# Patient Record
Sex: MALE | Race: WHITE | NOT HISPANIC OR LATINO
[De-identification: names, ages, dates, MRNs, and addresses within clinical notes are randomized per-mention and may not be internally consistent; named-entity substitution may affect disease eponyms.]

---
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
## 2023-06-13 ENCOUNTER — APPOINTMENT (OUTPATIENT)
Dept: ORTHOPEDIC SURGERY | Facility: CLINIC | Age: 18
End: 2023-06-13
Payer: COMMERCIAL

## 2023-06-13 VITALS
SYSTOLIC BLOOD PRESSURE: 134 MMHG | OXYGEN SATURATION: 97 % | BODY MASS INDEX: 28.56 KG/M2 | DIASTOLIC BLOOD PRESSURE: 69 MMHG | TEMPERATURE: 98 F | HEIGHT: 71 IN | HEART RATE: 52 BPM | WEIGHT: 204 LBS

## 2023-06-13 PROCEDURE — 73630 X-RAY EXAM OF FOOT: CPT | Mod: 50

## 2023-06-13 PROCEDURE — 99214 OFFICE O/P EST MOD 30 MIN: CPT

## 2023-06-15 ENCOUNTER — APPOINTMENT (OUTPATIENT)
Dept: ALLERGY | Facility: CLINIC | Age: 18
End: 2023-06-15
Payer: COMMERCIAL

## 2023-06-15 ENCOUNTER — NON-APPOINTMENT (OUTPATIENT)
Age: 18
End: 2023-06-15

## 2023-06-15 VITALS
BODY MASS INDEX: 28.28 KG/M2 | DIASTOLIC BLOOD PRESSURE: 68 MMHG | TEMPERATURE: 98.4 F | HEART RATE: 56 BPM | RESPIRATION RATE: 16 BRPM | WEIGHT: 202 LBS | SYSTOLIC BLOOD PRESSURE: 132 MMHG | OXYGEN SATURATION: 97 % | HEIGHT: 71 IN

## 2023-06-15 PROCEDURE — 95004 PERQ TESTS W/ALRGNC XTRCS: CPT

## 2023-06-15 PROCEDURE — 99204 OFFICE O/P NEW MOD 45 MIN: CPT | Mod: 25

## 2023-06-15 NOTE — IMPRESSION
[_____] : grasses ([unfilled]) [Allergy Testing Dust Mite] : dust mites [Allergy Testing Cockroach] : cockroach [Allergy Testing Dog] : dog [Allergy Testing Cat] : cat [] : molds

## 2023-06-15 NOTE — ASSESSMENT
[FreeTextEntry1] : Seasonal allergic rhinoconjunctivitis:\par Chronic sinusitis\par Anosmia\par \par Doxycycline 100 mg BID x 14 days\par Hypertonic saline irrigation TID\par Flonase 2 puffs QD \par \par RV rhinoscopy if not improved he will need a course of prednisone

## 2023-06-15 NOTE — SOCIAL HISTORY
[House] : [unfilled] lives in a house  [Radiator/Baseboard] : heating provided by radiator(s)/baseboard(s) [Central] : air conditioning provided by central unit [Dog] : dog [Single] : single [FreeTextEntry1] : Lives with parents\par Rising sophomore  [Bedroom] : not in the bedroom [Basement] : not in the basement [Living Area] : not in the living area [Smokers in Household] : there are no smokers in the home [de-identified] : x2

## 2023-06-15 NOTE — HISTORY OF PRESENT ILLNESS
[de-identified] : Patient with increased allergies at school - nasal congestion - coughing eyes - rhinorrhea - at the end of school he had low grade fever - more mucus - coughing - COVID negative - now with lingering symptoms - green mucus from his nose and chest - sinus HA - decreased sense of smell and taste.   No wheezing in chest.   \par \par For seasonal allergies - he was taking Claritin and Flonase. \par \par He has had sinus infections at school - treated with Doxycycline and azithromycin.

## 2023-06-15 NOTE — PHYSICAL EXAM
[Alert] : alert [Well Nourished] : well nourished [Healthy Appearance] : healthy appearance [No Acute Distress] : no acute distress [Well Developed] : well developed [Normal Voice/Communication] : normal voice communication [Boggy Nasal Turbinates] : no boggy and/or pale nasal turbinates [No Neck Mass] : no neck mass was observed [No LAD] : no lymphadenopathy [No Thyroid Mass] : no thyroid mass [No Crackles] : no crackles [Normal Rate and Effort] : normal respiratory rhythm and effort [No Retractions] : no retractions [Wheezing] : no wheezing was heard [Normal Rate] : heart rate was normal  [Normal S1, S2] : normal S1 and S2 [No murmur] : no murmur [Regular Rhythm] : with a regular rhythm [Normal Cervical Lymph Nodes] : cervical [No Rash] : no rash [Normal Mood] : mood was normal [Normal Affect] : affect was normal [Judgment and Insight Age Appropriate] : judgement and insight is age appropriate [Alert, Awake, Oriented as Age-Appropriate] : alert, awake, oriented as age appropriate

## 2023-06-19 ENCOUNTER — OUTPATIENT (OUTPATIENT)
Dept: OUTPATIENT SERVICES | Facility: HOSPITAL | Age: 18
LOS: 1 days | End: 2023-06-19
Payer: COMMERCIAL

## 2023-06-19 ENCOUNTER — APPOINTMENT (OUTPATIENT)
Dept: MRI IMAGING | Facility: CLINIC | Age: 18
End: 2023-06-19
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
## 2023-06-19 PROCEDURE — 73718 MRI LOWER EXTREMITY W/O DYE: CPT | Mod: 26,LT

## 2023-06-19 PROCEDURE — 73718 MRI LOWER EXTREMITY W/O DYE: CPT

## 2023-06-28 ENCOUNTER — APPOINTMENT (OUTPATIENT)
Dept: ALLERGY | Facility: CLINIC | Age: 18
End: 2023-06-28
Payer: COMMERCIAL

## 2023-06-28 PROCEDURE — 99213 OFFICE O/P EST LOW 20 MIN: CPT | Mod: 25

## 2023-06-28 PROCEDURE — 31231 NASAL ENDOSCOPY DX: CPT | Mod: 59

## 2023-06-28 NOTE — HISTORY OF PRESENT ILLNESS
[de-identified] : Patient has been taking Doxycycline BID - hypertonic saline and Flonase - he reports significant improvement in his symptoms

## 2023-06-28 NOTE — SOCIAL HISTORY
[FreeTextEntry1] : Lives with parents\par Rising sophomore  [House] : [unfilled] lives in a house  [Radiator/Baseboard] : heating provided by radiator(s)/baseboard(s) [Central] : air conditioning provided by central unit [Bedroom] : not in the bedroom [Basement] : not in the basement [Living Area] : not in the living area [Dog] : dog [Single] : single [Smokers in Household] : there are no smokers in the home [de-identified] : x2

## 2023-06-28 NOTE — PHYSICAL EXAM
[Alert] : alert [Well Nourished] : well nourished [Healthy Appearance] : healthy appearance [No Acute Distress] : no acute distress [Well Developed] : well developed [Normal Voice/Communication] : normal voice communication [Boggy Nasal Turbinates] : no boggy and/or pale nasal turbinates [No Neck Mass] : no neck mass was observed [No LAD] : no lymphadenopathy [No Thyroid Mass] : no thyroid mass [Normal Rate and Effort] : normal respiratory rhythm and effort [No Crackles] : no crackles [No Retractions] : no retractions [Wheezing] : no wheezing was heard [Normal Rate] : heart rate was normal  [Normal S1, S2] : normal S1 and S2 [No murmur] : no murmur [Regular Rhythm] : with a regular rhythm [Normal Cervical Lymph Nodes] : cervical [No Rash] : no rash [Normal Mood] : mood was normal [Normal Affect] : affect was normal [Judgment and Insight Age Appropriate] : judgement and insight is age appropriate [Alert, Awake, Oriented as Age-Appropriate] : alert, awake, oriented as age appropriate

## 2023-06-28 NOTE — ASSESSMENT
[FreeTextEntry1] : Seasonal allergic rhinitis\par Deviated septum\par \par Continue hypertonic saline followed by Flonase\par Patient referred to Dr. Henry for DNS and if surgical repair is indicated. \par \par Remote history of Penicillin allergy - patient to RV for amoxicillin oral challenge - no indication for antibiotic skin testing.

## 2023-07-05 ENCOUNTER — APPOINTMENT (OUTPATIENT)
Dept: ORTHOPEDIC SURGERY | Facility: CLINIC | Age: 18
End: 2023-07-05
Payer: COMMERCIAL

## 2023-07-05 PROCEDURE — 99204 OFFICE O/P NEW MOD 45 MIN: CPT | Mod: 25

## 2023-07-05 PROCEDURE — 20604 DRAIN/INJ JOINT/BURSA W/US: CPT | Mod: LT

## 2023-07-05 NOTE — HISTORY OF PRESENT ILLNESS
[de-identified] : Patient is here for left foot pain. Patient was evaluated by Dr. Andrade who sent the patient for MRI which revealed a cyst. Patient would like to proceed with aspiration. \par \par The patient's past medical history, past surgical history, medications and allergies were reviewed by me today and documented accordingly. In addition, the patient's family and social history, which were noncontributory to this visit, were reviewed also. Intake form was reviewed. The patient has no family history of arthritis.

## 2023-07-05 NOTE — PHYSICAL EXAM
[de-identified] : Constitutional: Well-nourished, well-developed, No acute distress\par Respiratory:  Good respiratory effort, no SOB\par Lymphatic: No regional lymphadenopathy, no lymphedema\par Psychiatric: Pleasant and normal affect, alert and oriented x3\par Musculoskeletal: normal except where as noted in regional exam\par \par Left foot:\par APPEARANCE: no swelling, no marked deformities or malalignment\par POSITIVE TENDERNESS: First tarsometatarsal joint\par NONTENDER: 5th metatarsal base, cuboid, 1st MTP, dorsum & plantar surfaces, medial heel, mid heel. \par ROM: normal throughout foot, ankle, and digits. \par RESISTIVE TESTING: painless flex/ext, abd/add of all digits. \par   [de-identified] : I reviewed, interpreted and clinically correlated the following outside imaging studies,\par \par EXAM: [id number] - MR FOOT LT - ORDERED BY: NAVEED VILLARREAL\par \par \par PROCEDURE DATE: 06/19/2023\par \par \par \par INTERPRETATION: EXAMINATION: MR FOOT LEFT\par \par CLINICAL INDICATION:Evaluate for chondral injury at the first tarsometatarsal joint. Pain.\par \par COMPARISON: None.\par \par TECHNIQUE: Multiplanar, multi-sequence MRI of the left midfoot was performed without intravenous contrast.\par \par INTERPRETATION:\par Joints/Bones: There is no focal cartilage loss at the first tarsometatarsal joint, as clinically questioned. Arising from the first tarsometatarsal joint laterally along the plantar/lateral aspect of the first metatarsal is a rounded lesion measuring 8 mm likely to represent a synovial cyst arising from that articulation, with a thin tract of fluid noted to extend to the joint. Cartilage is maintained.\par \par Soft Tissues: There is mild T2 hyperintensity at the peroneus longus attachment at the base of the first metatarsal abutting the synovial cyst. There is trace peroneus longus tenosynovitis. There is mild intermetatarsal bursal fluid at the first and third interspaces. Lisfranc ligament is intact.\par \par Other: On 2 images only at the level of the lateral malleolus seen on coronal series 4 images 43 and 44, there is T2 hyperintense signal abnormality which may represent lateral malleolar subcutaneous edema versus other, incompletely evaluated.\par \par IMPRESSION:\par 1. 8 mm synovial cyst arising from the first tarsometatarsal joint articulation. No focal cartilage loss or subchondral bone marrow edema. Mild adjacent tendinosis at the insertion of the peroneus longus tendon at the first metatarsal base.\par \par 2. Partially imaged patchy T2 hyperintense signal abnormality in the region of the lateral malleolus which is incompletely evaluated, may represent edema overlying the lateral malleolus versus other. Ankle MRI would be required to further evaluate this finding, if clinically warranted.\par \par 3. Mild intermetatarsal bursitis at the first and third interspaces.\par \par --- End of Report ---\par

## 2023-07-05 NOTE — DISCUSSION/SUMMARY
[de-identified] : Discussed findings of today's exam and possible causes of patient's pain.  Educated patient on their most probable diagnosis of chronic 1+ year of intermittent left foot pain localized to the first TMT joint with recent atraumatic exacerbation.  Reviewed possible courses of treatment, and we collaboratively decided best course of treatment at this time will include conservative management.  Patient was recently seen by my foot and ankle orthopedist, he was sent for MRI of the left foot, the only significant abnormal finding and potential etiology of the patient's pain was an 8 mm cyst at the first tarsometatarsal joint.  This cyst could not be identified via ultrasound evaluation today.  However, the patient does have localized pain to the first TMT joint in the area of the cyst.  We discussed utilization of diagnostic/therapeutic intra-articular cortisone injection. We discussed various treatment options as well as associated risk/benefits/alternatives and patient elected to proceed with a diagnostic/therapeutic ultrasound guided steroid injection today (see procedure note).  Patient advised to make note of whether their pain is improved starting in the next few minutes until 4-6 hours while the bupivacaine numbs the interior of the first tarsometatarsal joint; this is the diagnostic part of the injection. If pain is relieved immediately that helps us determine that the etiology of the pain is coming from within the joint. The steroid injected into the joint today will start to have an effect in the coming days, will be most effective in the next 1-2 weeks, and may last 1-2 months; that is the therapeutic portion of this injection.  Patient is a collegiate ice .  Patient is advised to avoid skating and ice hockey activity for at least the next few days.  The patient should follow up with Dr. Andrade, in 1-2 months for further management.     Patient appreciates and agrees with current plan.\par \par \par This note was generated using dragon medical dictation software.  A reasonable effort has been made for proofreading its contents, but typos may still remain.  If there are any questions or points of clarification needed please notify my office.

## 2023-07-10 ENCOUNTER — APPOINTMENT (OUTPATIENT)
Dept: ALLERGY | Facility: CLINIC | Age: 18
End: 2023-07-10
Payer: COMMERCIAL

## 2023-07-10 PROCEDURE — 95076 INGEST CHALLENGE INI 120 MIN: CPT

## 2023-07-10 NOTE — IMPRESSION
[FreeTextEntry2] : Patient tolerated his oral amoxicillin challenge with no immediate allergic reaction

## 2023-07-12 ENCOUNTER — APPOINTMENT (OUTPATIENT)
Dept: OTOLARYNGOLOGY | Facility: CLINIC | Age: 18
End: 2023-07-12
Payer: COMMERCIAL

## 2023-07-12 VITALS
DIASTOLIC BLOOD PRESSURE: 79 MMHG | BODY MASS INDEX: 28 KG/M2 | WEIGHT: 200 LBS | HEART RATE: 54 BPM | HEIGHT: 71 IN | SYSTOLIC BLOOD PRESSURE: 135 MMHG

## 2023-07-12 DIAGNOSIS — J34.2 DEVIATED NASAL SEPTUM: ICD-10-CM

## 2023-07-12 PROCEDURE — 99203 OFFICE O/P NEW LOW 30 MIN: CPT | Mod: 25

## 2023-07-12 PROCEDURE — 31231 NASAL ENDOSCOPY DX: CPT

## 2023-07-12 RX ORDER — DOXYCYCLINE HYCLATE 100 MG/1
100 TABLET ORAL DAILY
Qty: 42 | Refills: 2 | Status: DISCONTINUED | COMMUNITY
Start: 2022-06-27 | End: 2023-07-12

## 2023-07-12 RX ORDER — IPRATROPIUM BROMIDE 21 UG/1
0.03 SPRAY NASAL 3 TIMES DAILY
Qty: 1 | Refills: 2 | Status: DISCONTINUED | COMMUNITY
Start: 2020-07-30 | End: 2023-07-12

## 2023-07-12 RX ORDER — TACROLIMUS 1 MG/G
0.1 OINTMENT TOPICAL
Qty: 1 | Refills: 2 | Status: DISCONTINUED | COMMUNITY
Start: 2022-06-24 | End: 2023-07-12

## 2023-07-12 RX ORDER — DOXYCYCLINE 100 MG/1
100 TABLET, FILM COATED ORAL
Qty: 28 | Refills: 0 | Status: DISCONTINUED | COMMUNITY
Start: 2023-06-15 | End: 2023-07-12

## 2023-07-12 RX ORDER — AMOXICILLIN 250 MG/5ML
250 POWDER, FOR SUSPENSION ORAL 3 TIMES DAILY
Qty: 1 | Refills: 0 | Status: DISCONTINUED | COMMUNITY
Start: 2023-06-28 | End: 2023-07-12

## 2023-07-12 NOTE — REASON FOR VISIT
[Initial Consultation] : an initial consultation for [FreeTextEntry2] : referred by Dr. Mitchell Boxer for chronic sinusitis and deviated septum.

## 2023-07-12 NOTE — PROCEDURE
[FreeTextEntry1] : nasal endoscopy [FreeTextEntry3] : Procedure: nasal endoscopy \par \par Pre-operative diagnosis: \par \par Indication: Anterior rhinoscopy insufficient to diagnose pathology\par \par Details:\par After decongestant and lidocaine was sprayed in the bilateral nasal cavities, a flexible laryngoscope was inserted into the right nares. The nasal cavity, middle meatus, ETO, nasopharynx, and glottis were visualized. The endoscope was then inserted into the left nares and the nasal cavity, middle meatus, and ETO was visualized. The patient tolerated procedure well.\par \par Findings:\par left septal deviation\par BITH [FreeTextEntry2] : nasal obstruction

## 2023-07-12 NOTE — CONSULT LETTER
[Dear  ___] : Dear  [unfilled], [Consult Letter:] : I had the pleasure of evaluating your patient, [unfilled]. [Please see my note below.] : Please see my note below. [Consult Closing:] : Thank you very much for allowing me to participate in the care of this patient.  If you have any questions, please do not hesitate to contact me. [Sincerely,] : Sincerely, [FreeTextEntry2] : Dr. Boxer [FreeTextEntry3] : Kayla Henry MD\par Facial Plastic & Reconstructive Surgery\par Department of Otolaryngology\par [address] [facility]\par [place], [place]\par [phone]\par \par [address] [facility] [facility] [address]\[place] [address]\par [phone]

## 2023-07-12 NOTE — PHYSICAL EXAM
[Nasal Endoscopy Performed] : nasal endoscopy was performed, see procedure section for findings [] : septum deviated to the left [Midline] : trachea located in midline position [de-identified] : dorsal hump/prominence\par midline dorsum [Normal] : no rashes

## 2023-07-12 NOTE — ASSESSMENT
[FreeTextEntry1] : 18 year old male with left septal deviation BITH. he has had no improvement with nasal sprays. We discussed septoplasty and bilateral inferior turbinate reduction. \par \par I discussed the risks, benefits, and alternatives for functional septoplasty. Risks include bleeding, infection, need for revision, postoperative swelling. We discussed risk of septal perforation, persistent nasal obstruction or recurrent nasal obstruction, scarring, synechiae. We also discussed alternatives including continued flonase and breathe right strip use. \par \par I explained the surgery with nasal diagrams. We will plan for a septoplasty. We will also plan for inferior turbinate reduction.There are no cosmetic concerns.\par \par Will proceed with booking surgery.\par

## 2023-07-12 NOTE — HISTORY OF PRESENT ILLNESS
[de-identified] : 18 year old male referred by Dr.Mitchell Boxer for chronic sinusitis and deviated septum. \par Report about 4-5 sinus infections in the past year treated with antibiotics--last one this past month\par Reports frequent nasal congestion, sinus pain/pressure and pain on the top teeth, post nasal drip with occasional green phlegm production and clear anterior rhinorrhea.\par Denies epistaxis.\par Sense of smell is poor \par No recent scans\par He reports frequent sinus infections. Denies snoring. \par

## 2023-07-17 ENCOUNTER — OUTPATIENT (OUTPATIENT)
Dept: OUTPATIENT SERVICES | Facility: HOSPITAL | Age: 18
LOS: 1 days | End: 2023-07-17
Payer: COMMERCIAL

## 2023-07-17 ENCOUNTER — APPOINTMENT (OUTPATIENT)
Dept: CT IMAGING | Facility: HOSPITAL | Age: 18
End: 2023-07-17
Payer: COMMERCIAL

## 2023-07-17 DIAGNOSIS — J30.9 ALLERGIC RHINITIS, UNSPECIFIED: ICD-10-CM

## 2023-07-17 PROCEDURE — 70486 CT MAXILLOFACIAL W/O DYE: CPT

## 2023-07-17 PROCEDURE — 70486 CT MAXILLOFACIAL W/O DYE: CPT | Mod: 26

## 2023-07-20 ENCOUNTER — OUTPATIENT (OUTPATIENT)
Dept: OUTPATIENT SERVICES | Facility: HOSPITAL | Age: 18
LOS: 1 days | End: 2023-07-20
Payer: COMMERCIAL

## 2023-07-20 VITALS
SYSTOLIC BLOOD PRESSURE: 122 MMHG | TEMPERATURE: 98 F | DIASTOLIC BLOOD PRESSURE: 74 MMHG | WEIGHT: 194.45 LBS | HEIGHT: 71 IN | RESPIRATION RATE: 16 BRPM | OXYGEN SATURATION: 100 % | HEART RATE: 60 BPM

## 2023-07-20 DIAGNOSIS — J34.2 DEVIATED NASAL SEPTUM: ICD-10-CM

## 2023-07-20 DIAGNOSIS — J30.9 ALLERGIC RHINITIS, UNSPECIFIED: ICD-10-CM

## 2023-07-20 DIAGNOSIS — Z01.818 ENCOUNTER FOR OTHER PREPROCEDURAL EXAMINATION: ICD-10-CM

## 2023-07-20 DIAGNOSIS — K08.409 PARTIAL LOSS OF TEETH, UNSPECIFIED CAUSE, UNSPECIFIED CLASS: Chronic | ICD-10-CM

## 2023-07-20 PROCEDURE — G0463: CPT

## 2023-07-20 NOTE — H&P PST ADULT - HISTORY OF PRESENT ILLNESS
This is a 17 y/o male who presents to PST with pre-operative diagnosis of deviated septum.  Pt reports h/o chronic nasal congestion and 4-5 sinus infections per year, last infection, was one month ago.  Today pt denies any facial tenderness, fever, chills, purulent nasal discharge.  Pt feels well and denies any acute symptoms.

## 2023-07-20 NOTE — H&P PST ADULT - PROBLEM SELECTOR PLAN 1
Patient provided with pre-operative instructions and verbalized understanding.  Patient will be NPO on day of surgery. Patient will stop NSAIDs, aspirin, herbal supplements or vitamins 1 week prior to surgery.

## 2023-07-20 NOTE — H&P PST ADULT - LYMPHATIC
Pt remains hypotensive, additional 1L NS started per erp.   Pt RA sat 82% while sleeping, maintaining. 2L NC placed.    No lymphadedenopathy

## 2023-07-20 NOTE — H&P PST ADULT - BMI (KG/M2)
From: Trey Taylor  To: Ventura Clemons  Sent: 1/2/2023 7:40 AM CST  Subject: Medication Question    Hello,  I recently switched my medications to a new online pharmacy through [facility] and I received a message from them to check and see if there’s a drug interaction between two medicines I take and if my doctors approved them together.   Metformin and Biktarvy   Just reaching out to make sure it’s ok to take these medications together.   Thanks  Trey Taylor   27.1

## 2023-08-01 ENCOUNTER — APPOINTMENT (OUTPATIENT)
Dept: OTOLARYNGOLOGY | Facility: CLINIC | Age: 18
End: 2023-08-01

## 2023-08-01 ENCOUNTER — EMERGENCY (EMERGENCY)
Facility: HOSPITAL | Age: 18
LOS: 1 days | Discharge: HOME | End: 2023-08-01
Attending: STUDENT IN AN ORGANIZED HEALTH CARE EDUCATION/TRAINING PROGRAM | Admitting: STUDENT IN AN ORGANIZED HEALTH CARE EDUCATION/TRAINING PROGRAM
Payer: COMMERCIAL

## 2023-08-01 ENCOUNTER — APPOINTMENT (OUTPATIENT)
Dept: OTOLARYNGOLOGY | Facility: HOSPITAL | Age: 18
End: 2023-08-01

## 2023-08-01 VITALS
SYSTOLIC BLOOD PRESSURE: 145 MMHG | DIASTOLIC BLOOD PRESSURE: 74 MMHG | RESPIRATION RATE: 16 BRPM | HEART RATE: 57 BPM | OXYGEN SATURATION: 100 %

## 2023-08-01 VITALS
WEIGHT: 195.11 LBS | TEMPERATURE: 98 F | HEART RATE: 48 BPM | DIASTOLIC BLOOD PRESSURE: 61 MMHG | SYSTOLIC BLOOD PRESSURE: 121 MMHG | HEIGHT: 71 IN | OXYGEN SATURATION: 100 % | RESPIRATION RATE: 16 BRPM

## 2023-08-01 DIAGNOSIS — K08.409 PARTIAL LOSS OF TEETH, UNSPECIFIED CAUSE, UNSPECIFIED CLASS: Chronic | ICD-10-CM

## 2023-08-01 PROBLEM — J34.2 DEVIATED NASAL SEPTUM: Chronic | Status: ACTIVE | Noted: 2023-07-20

## 2023-08-01 LAB
ALBUMIN SERPL ELPH-MCNC: 4 G/DL — SIGNIFICANT CHANGE UP (ref 3.3–5)
ALP SERPL-CCNC: 105 U/L — SIGNIFICANT CHANGE UP (ref 40–120)
ALT FLD-CCNC: 32 U/L — SIGNIFICANT CHANGE UP (ref 10–45)
ANION GAP SERPL CALC-SCNC: 10 MMOL/L — SIGNIFICANT CHANGE UP (ref 5–17)
AST SERPL-CCNC: 40 U/L — SIGNIFICANT CHANGE UP (ref 10–40)
BASOPHILS # BLD AUTO: 0.07 K/UL — SIGNIFICANT CHANGE UP (ref 0–0.2)
BASOPHILS NFR BLD AUTO: 1.1 % — SIGNIFICANT CHANGE UP (ref 0–2)
BILIRUB SERPL-MCNC: 1.6 MG/DL — HIGH (ref 0.2–1.2)
BUN SERPL-MCNC: 17 MG/DL — SIGNIFICANT CHANGE UP (ref 7–23)
CALCIUM SERPL-MCNC: 9.1 MG/DL — SIGNIFICANT CHANGE UP (ref 8.4–10.5)
CHLORIDE SERPL-SCNC: 103 MMOL/L — SIGNIFICANT CHANGE UP (ref 96–108)
CO2 SERPL-SCNC: 25 MMOL/L — SIGNIFICANT CHANGE UP (ref 22–31)
CREAT SERPL-MCNC: 1.08 MG/DL — SIGNIFICANT CHANGE UP (ref 0.5–1.3)
D DIMER BLD IA.RAPID-MCNC: <150 NG/ML DDU — SIGNIFICANT CHANGE UP
EGFR: 100 ML/MIN/1.73M2 — SIGNIFICANT CHANGE UP
EOSINOPHIL # BLD AUTO: 0.17 K/UL — SIGNIFICANT CHANGE UP (ref 0–0.5)
EOSINOPHIL NFR BLD AUTO: 2.8 % — SIGNIFICANT CHANGE UP (ref 0–6)
GLUCOSE SERPL-MCNC: 120 MG/DL — HIGH (ref 70–99)
HCT VFR BLD CALC: 46.3 % — SIGNIFICANT CHANGE UP (ref 39–50)
HGB BLD-MCNC: 15.5 G/DL — SIGNIFICANT CHANGE UP (ref 13–17)
IMM GRANULOCYTES NFR BLD AUTO: 0.2 % — SIGNIFICANT CHANGE UP (ref 0–0.9)
LYMPHOCYTES # BLD AUTO: 2.84 K/UL — SIGNIFICANT CHANGE UP (ref 1–3.3)
LYMPHOCYTES # BLD AUTO: 46 % — HIGH (ref 13–44)
MAGNESIUM SERPL-MCNC: 2.1 MG/DL — SIGNIFICANT CHANGE UP (ref 1.6–2.6)
MCHC RBC-ENTMCNC: 30.4 PG — SIGNIFICANT CHANGE UP (ref 27–34)
MCHC RBC-ENTMCNC: 33.5 GM/DL — SIGNIFICANT CHANGE UP (ref 32–36)
MCV RBC AUTO: 90.8 FL — SIGNIFICANT CHANGE UP (ref 80–100)
MONOCYTES # BLD AUTO: 0.5 K/UL — SIGNIFICANT CHANGE UP (ref 0–0.9)
MONOCYTES NFR BLD AUTO: 8.1 % — SIGNIFICANT CHANGE UP (ref 2–14)
NEUTROPHILS # BLD AUTO: 2.59 K/UL — SIGNIFICANT CHANGE UP (ref 1.8–7.4)
NEUTROPHILS NFR BLD AUTO: 41.8 % — LOW (ref 43–77)
NRBC # BLD: 0 /100 WBCS — SIGNIFICANT CHANGE UP (ref 0–0)
NT-PROBNP SERPL-SCNC: 15 PG/ML — SIGNIFICANT CHANGE UP (ref 0–300)
PLATELET # BLD AUTO: 176 K/UL — SIGNIFICANT CHANGE UP (ref 150–400)
POTASSIUM SERPL-MCNC: 4.3 MMOL/L — SIGNIFICANT CHANGE UP (ref 3.5–5.3)
POTASSIUM SERPL-SCNC: 4.3 MMOL/L — SIGNIFICANT CHANGE UP (ref 3.5–5.3)
PROT SERPL-MCNC: 7.6 G/DL — SIGNIFICANT CHANGE UP (ref 6–8.3)
RBC # BLD: 5.1 M/UL — SIGNIFICANT CHANGE UP (ref 4.2–5.8)
RBC # FLD: 12.7 % — SIGNIFICANT CHANGE UP (ref 10.3–14.5)
SODIUM SERPL-SCNC: 138 MMOL/L — SIGNIFICANT CHANGE UP (ref 135–145)
TROPONIN I, HIGH SENSITIVITY RESULT: 65.4 NG/L — SIGNIFICANT CHANGE UP
WBC # BLD: 6.18 K/UL — SIGNIFICANT CHANGE UP (ref 3.8–10.5)
WBC # FLD AUTO: 6.18 K/UL — SIGNIFICANT CHANGE UP (ref 3.8–10.5)

## 2023-08-01 PROCEDURE — 71045 X-RAY EXAM CHEST 1 VIEW: CPT

## 2023-08-01 PROCEDURE — 85025 COMPLETE CBC W/AUTO DIFF WBC: CPT

## 2023-08-01 PROCEDURE — 36415 COLL VENOUS BLD VENIPUNCTURE: CPT

## 2023-08-01 PROCEDURE — 93010 ELECTROCARDIOGRAM REPORT: CPT

## 2023-08-01 PROCEDURE — 83735 ASSAY OF MAGNESIUM: CPT

## 2023-08-01 PROCEDURE — 93306 TTE W/DOPPLER COMPLETE: CPT

## 2023-08-01 PROCEDURE — 93306 TTE W/DOPPLER COMPLETE: CPT | Mod: 26

## 2023-08-01 PROCEDURE — 96360 HYDRATION IV INFUSION INIT: CPT | Mod: XU

## 2023-08-01 PROCEDURE — 99284 EMERGENCY DEPT VISIT MOD MDM: CPT

## 2023-08-01 PROCEDURE — 82962 GLUCOSE BLOOD TEST: CPT

## 2023-08-01 PROCEDURE — 71045 X-RAY EXAM CHEST 1 VIEW: CPT | Mod: 26

## 2023-08-01 PROCEDURE — 99285 EMERGENCY DEPT VISIT HI MDM: CPT | Mod: 25

## 2023-08-01 PROCEDURE — 99285 EMERGENCY DEPT VISIT HI MDM: CPT

## 2023-08-01 PROCEDURE — 83880 ASSAY OF NATRIURETIC PEPTIDE: CPT

## 2023-08-01 PROCEDURE — 80053 COMPREHEN METABOLIC PANEL: CPT

## 2023-08-01 PROCEDURE — 85379 FIBRIN DEGRADATION QUANT: CPT

## 2023-08-01 PROCEDURE — 93005 ELECTROCARDIOGRAM TRACING: CPT

## 2023-08-01 PROCEDURE — 84484 ASSAY OF TROPONIN QUANT: CPT

## 2023-08-01 RX ORDER — SODIUM CHLORIDE 9 MG/ML
1000 INJECTION INTRAMUSCULAR; INTRAVENOUS; SUBCUTANEOUS ONCE
Refills: 0 | Status: COMPLETED | OUTPATIENT
Start: 2023-08-01 | End: 2023-08-01

## 2023-08-01 RX ADMIN — SODIUM CHLORIDE 1000 MILLILITER(S): 9 INJECTION INTRAMUSCULAR; INTRAVENOUS; SUBCUTANEOUS at 09:57

## 2023-08-01 RX ADMIN — SODIUM CHLORIDE 1000 MILLILITER(S): 9 INJECTION INTRAMUSCULAR; INTRAVENOUS; SUBCUTANEOUS at 08:32

## 2023-08-01 NOTE — ED PROVIDER NOTE - PATIENT PORTAL LINK FT
You can access the FollowMyHealth Patient Portal offered by [facility] by registering at the following website: [email or web address]. By joining [facility]’s FollowMyHealth portal, you will also be able to view your health information using other applications (apps) compatible with our system.

## 2023-08-01 NOTE — ED ADULT NURSE NOTE - NSFALLUNIVINTERV_ED_ALL_ED
Bed/Stretcher in lowest position, wheels locked, appropriate side rails in place/Call bell, personal items and telephone in reach/Instruct patient to call for assistance before getting out of bed/chair/stretcher/Non-slip footwear applied when patient is off stretcher/[place] to call system/Physically safe environment - no spills, clutter or unnecessary equipment/Purposeful proactive rounding/Room/bathroom lighting operational, light cord in reach

## 2023-08-01 NOTE — ED PROVIDER NOTE - OBJECTIVE STATEMENT
17 y/o M with no significant PMH presents to the ED from pre-op at [facility] after a syncopal episode x 30 minutes. Pt was in pre-op for a septoplasty due to a deviated septum of the lt nostril. Pt was in pre-op when nurses were placing an IV and he passed out. Pt denies any head injury. Pt then had another 2 syncopal episodes while in pre-op shortly after about a few minutes apart. Pt states he has had 2 prior syncopal episodes this year where he either got a cut on his body or blood loss was involved. Pt's reports last syncopal episode was 2.5 months prior. Pt states prior to these two episodes he has never had a issue giving blood or seeing blood. Pt notes that he usually has a low heart rate around 45bpm. Pt explains he is otherwise healthy and works out regularly. Pt denies chest pain, SOB, NV, dizziness, HA, fever, body aches, chills, or any other acute complaints.

## 2023-08-01 NOTE — ED PROVIDER NOTE - CLINICAL SUMMARY MEDICAL DECISION MAKING FREE TEXT BOX
18-year-old male with no significant past medical history.  Previous history of syncopal episodes, likely vasovagal episodes, reported with previous blood draws, patient has not been eating and drinking secondary to scheduled septoplasty today.  Patient does feel like he is a little dehydrated, denies any associated chest pain shortness of breath abdominal pain vomiting or diarrhea.  Well-appearing, neurologically intact, denies any shortness of breath with exertion.  No signs of tachypnea, doubt PE will send D-dimer given EKG shows incomplete right bundle branch block T wave inversion biphasic T wave in V2.  EKG sinus bradycardia, on telemetry heart rate 49.  Patient has low resting heart rate, pretty active, reports heart rate is at his baseline.  Doubt symptomatic bradycardia, consulted cardiology Dr. Rosado for cardiac clearance as mom is requesting patient be cleared for surgery today. 18-year-old male with no significant past medical history.  Previous history of syncopal episodes, likely vasovagal episodes, reported with previous blood draws, patient has not been eating and drinking secondary to scheduled septoplasty today.  Patient does feel like he is a little dehydrated, denies any associated chest pain shortness of breath abdominal pain vomiting or diarrhea.  Well-appearing, neurologically intact, denies any shortness of breath with exertion.  No signs of tachypnea, doubt PE will send D-dimer given EKG shows incomplete right bundle branch block T wave inversion biphasic T wave in V2.  EKG sinus bradycardia, on telemetry heart rate 49.  Patient has low resting heart rate, pretty active, reports heart rate is at his baseline.  Doubt symptomatic bradycardia, consulted cardiology Dr. Quintanilla for cardiac clearance as mom is requesting patient be cleared for surgery today.    cleared by cardio, patient to have surgery as outpatient   return precautions

## 2023-08-01 NOTE — ED PROVIDER NOTE - PHYSICAL EXAMINATION
VITAL SIGNS: I have reviewed nursing notes and confirm.  CONSTITUTIONAL: well-appearing, non-toxic, NAD  SKIN: Warm dry, normal skin turgor  HEAD: NCAT  EYES: EOMI, PERRLA, no scleral icterus  ENT: dry mucous membranes, normal pharynx with no erythema or exudates  NECK: Supple; non tender. Full ROM. No cervical LAD  CARD: RRR, no murmurs, rubs or gallops  RESP: clear to ausculation b/l.  No rales, rhonchi, or wheezing.  ABD: soft, + BS, non-tender, non-distended, no rebound or guarding. No CVA tenderness  EXT: Full ROM, no bony tenderness, no pedal edema, no calf tenderness  NEURO: normal motor. normal sensory. CN II-XII intact. Cerebellar testing normal. Normal gait.  PSYCH: Cooperative, appropriate.

## 2023-08-01 NOTE — ED ADULT NURSE NOTE - CAS TRG GEN SKIN CONDITION
Warm I have reviewed and confirmed nurses' notes for patient's medications, allergies, medical history, and surgical history.

## 2023-08-01 NOTE — CONSULT NOTE ADULT - ASSESSMENT
Assessment:  Cj Mosqueda is a 18 year old man with no significant past medical history who presented today for elective nasal surgery for deviated septum, had 2 episodes of syncope and now transferred to the ER, suggestive of vasovagal syncope.     The patient's mother is a physician and present at bedside. The patient reports that while the IV was being placed he felt lightheaded and then passed out. He woke up and had another episode of brief syncope.  He reports 3 prior episodes of syncope this year in setting of blood drawn, hot day and dehydration when exercising. He denies exertional angina or dyspnea and is athletic and has not experienced any limitations in symptoms. ECG consistent with sinus bradycardia and incomplete RBBB. Telemetry consistent with sinus bradycardia in 40s BPM likely from high vagal tone from being athletic.    CBC and BMP unremarkable. D-dimer negative. Troponin negative, no signs of an acute coronary syndrome.     Recommendations:  [] Vasovagal syncope: Symptoms suggestive of this, recommend IV fluids. Given incomplete RBBB on ECG and recurrent syncope recommend echocardiogram prior to nasal surgery, if within normal limits then may proceed with surgery.     Discussed with ER attending, patient and his mother.    Dominique Quintanilla MD  Cardiology            Assessment:  Cj Mosqueda is a 18 year old man with no significant past medical history who presented today for elective nasal surgery for deviated septum, had 2 episodes of syncope and now transferred to the ER, suggestive of vasovagal syncope.     The patient's mother is a physician and present at bedside. The patient reports that while the IV was being placed he felt lightheaded and then passed out. He woke up and had another episode of brief syncope.  He reports 3 prior episodes of syncope this year in setting of blood drawn, hot day and dehydration when exercising. He denies exertional angina or dyspnea and is athletic and has not experienced any limitations in symptoms. ECG consistent with sinus bradycardia and incomplete RBBB. Telemetry consistent with sinus bradycardia in 40s BPM likely from high vagal tone from being athletic.    CBC and BMP unremarkable. D-dimer negative. Troponin negative, no signs of an acute coronary syndrome.     Recommendations:  [] Vasovagal syncope: Symptoms suggestive of this, recommend IV fluids. Given incomplete RBBB on ECG and recurrent syncope recommend echocardiogram prior to nasal surgery, if within normal limits then may proceed with surgery.     Discussed with ER attending, patient and his mother.    Addendum:    TTE Report:   1. The heart rate is generally in 40s BPM throughout the study.   2. Normal global left ventricular systolic function.   3. Left ventricular ejection fraction, by visual estimation, is 60 to 65%.   4. Calculated LVEF by Simpsons Biplane Method is 65%.   5. Normal left ventricular internal cavity size.   6. Mildly enlarged right ventricle with normal right ventricle systolic   function.   7. The left atrium is normal in size.   8. The right atrium is normal in size.   9. Trace mitral valve regurgitation.  10. Mild tricuspid regurgitation.  11. No aortic valve stenosis.  12. Pulmonary hypertension is absent.  13. There is no evidence of pericardial effusion.    Echo consistent with normal LVEF 60-65%, dilated right ventricle with normal right ventricle systolic function, no pulmonary hypertension. Discussed results with patient's mother and that patient is at low-to-moderate cardiovascular risk prior to nasal surgery, may proceed at this level of risk. The patient should follow up with a cardiologist as an outpatient for consideration of cardiac MRI to re-evaluate right ventricle, which can be done after surgery. Updated ER attending.     Dominique Quintanilla MD  Cardiology

## 2023-08-01 NOTE — ED ADULT NURSE NOTE - BEFAST ARM SIDE DRIFT
No
Louise Garcia)  Urology  [address] [place] [address]  Phone: [phone]  Fax: [phone]  Follow Up Time:

## 2023-08-01 NOTE — ED ADULT NURSE NOTE - OBJECTIVE STATEMENT
Pt brought down from pre surg after syncopal episode during IV insertion.  Pt HR was 30s at the time, currently in the 40s which is hid baseline.  Pt is an athlete.

## 2023-08-01 NOTE — CONSULT NOTE ADULT - SUBJECTIVE AND OBJECTIVE BOX
CJ CEDENO  [id number]      HPI:    Cj Cedeno is a 18 year old man with no significant past medical history who presented today for elective nasal surgery for deviated septum, had 2 episodes of syncope and now transferred to the ER.    The patient's mother is a physician and present at bedside. The patient reports that while the IV was being placed he felt lightheaded and then passed out. He woke up and had another episode of brief syncope. These episodes lasted a few seconds. Denies prodromal symptoms of palpitations, chest pain or shortness of breath. Reports 3 prior episodes of syncope this year, one when he was having blood drawn, another on a hot day and another when he was exercising and lifting weights. Denies prior history of congenital heart disease. He exercises regularly and plays Hockey and does not experience any limitation in symptoms.       ALLERGIES:  No Known Allergies      PAST MEDICAL & SURGICAL HISTORY:  Deviated septum  [place] teeth removed      CURRENT MEDICATIONS:  None    SOCIAL HISTORY:  Denies smoking cigarettes  Denies alcohol use  Denies illicit drug use    FAMILY HISTORY:  Father: Type I DM, CAD    ROS:  All 10 systems reviewed and positives noted in HPI    OBJECTIVE:    VITAL SIGNS:  Vital Signs Last 24 Hrs  T(C): 36.4 (01 Aug 2023 07:58), Max: 36.6 (01 Aug 2023 05:49)  T(F): 97.5 (01 Aug 2023 07:58), Max: 97.9 (01 Aug 2023 06:25)  HR: 58 (01 Aug 2023 09:56) (48 - 58)  BP: 127/41 (01 Aug 2023 09:56) (121/61 - 138/78)  BP(mean): --  RR: 16 (01 Aug 2023 09:56) (15 - 16)  SpO2: 100% (01 Aug 2023 09:56) (99% - 100%)    Parameters below as of 01 Aug 2023 07:58  Patient On (Oxygen Delivery Method): room air      PHYSICAL EXAM:  General: well appearing, no distress  HEENT: sclera anicteric  Neck: supple, no carotid bruits b/l  CVS: JVP ~ 7 cm H20, RRR, s1, s2, no murmurs/rubs/gallops  Chest: unlabored respirations, clear to auscultation b/l  Abdomen: non-distended  Extremities: no lower extremity edema b/l  Neuro: awake, alert & oriented   Psych: normal affect      LABS:                        15.5   6.18  )-----------( 176      ( 01 Aug 2023 07:38 )             46.3     08-01    138  |  103  |  17  ----------------------------<  120<H>  4.3   |  25  |  1.08    Ca    9.1      01 Aug 2023 07:38  Mg     2.1     08-01    TPro  7.6  /  Alb  4.0  /  TBili  1.6<H>  /  DBili  x   /  AST  40  /  ALT  32  /  AlkPhos  105  08-01        ECG (8/1/23): sinus bradycardia, incomplete RBBB (present on prior ECG)       Ivermectin Counseling:  Patient instructed to take medication on an empty stomach with a full glass of water.  Patient informed of potential adverse effects including but not limited to nausea, diarrhea, dizziness, itching, and swelling of the extremities or lymph nodes.  The patient verbalized understanding of the proper use and possible adverse effects of ivermectin.  All of the patient's questions and concerns were addressed.

## 2023-08-02 ENCOUNTER — APPOINTMENT (OUTPATIENT)
Dept: OTOLARYNGOLOGY | Facility: CLINIC | Age: 18
End: 2023-08-02

## 2023-08-02 ENCOUNTER — TRANSCRIPTION ENCOUNTER (OUTPATIENT)
Age: 18
End: 2023-08-02

## 2023-08-03 ENCOUNTER — TRANSCRIPTION ENCOUNTER (OUTPATIENT)
Age: 18
End: 2023-08-03

## 2023-08-03 ENCOUNTER — APPOINTMENT (OUTPATIENT)
Dept: OTOLARYNGOLOGY | Facility: HOSPITAL | Age: 18
End: 2023-08-03

## 2023-08-03 ENCOUNTER — OUTPATIENT (OUTPATIENT)
Dept: OUTPATIENT SERVICES | Facility: HOSPITAL | Age: 18
LOS: 1 days | Discharge: HOME | End: 2023-08-03
Payer: COMMERCIAL

## 2023-08-03 ENCOUNTER — RESULT REVIEW (OUTPATIENT)
Age: 18
End: 2023-08-03

## 2023-08-03 VITALS
HEART RATE: 44 BPM | RESPIRATION RATE: 16 BRPM | OXYGEN SATURATION: 97 % | DIASTOLIC BLOOD PRESSURE: 67 MMHG | TEMPERATURE: 98 F | SYSTOLIC BLOOD PRESSURE: 117 MMHG

## 2023-08-03 VITALS
SYSTOLIC BLOOD PRESSURE: 121 MMHG | OXYGEN SATURATION: 100 % | HEART RATE: 54 BPM | DIASTOLIC BLOOD PRESSURE: 70 MMHG | TEMPERATURE: 98 F | HEIGHT: 71 IN | WEIGHT: 194.45 LBS

## 2023-08-03 DIAGNOSIS — R01.1 CARDIAC MURMUR, UNSPECIFIED: ICD-10-CM

## 2023-08-03 DIAGNOSIS — J34.2 DEVIATED NASAL SEPTUM: ICD-10-CM

## 2023-08-03 DIAGNOSIS — J30.9 ALLERGIC RHINITIS, UNSPECIFIED: ICD-10-CM

## 2023-08-03 DIAGNOSIS — K08.409 PARTIAL LOSS OF TEETH, UNSPECIFIED CAUSE, UNSPECIFIED CLASS: Chronic | ICD-10-CM

## 2023-08-03 PROCEDURE — 30520 REPAIR OF NASAL SEPTUM: CPT

## 2023-08-03 PROCEDURE — C9399: CPT

## 2023-08-03 PROCEDURE — 30140 RESECT INFERIOR TURBINATE: CPT | Mod: 50

## 2023-08-03 PROCEDURE — 88300 SURGICAL PATH GROSS: CPT | Mod: 26

## 2023-08-03 PROCEDURE — 30802 ABLATE INF TURBINATE SUBMUC: CPT

## 2023-08-03 PROCEDURE — 88300 SURGICAL PATH GROSS: CPT

## 2023-08-03 RX ORDER — ONDANSETRON 8 MG/1
4 TABLET, FILM COATED ORAL ONCE
Refills: 0 | Status: DISCONTINUED | OUTPATIENT
Start: 2023-08-03 | End: 2023-08-03

## 2023-08-03 RX ORDER — OXYCODONE HYDROCHLORIDE 5 MG/1
5 TABLET ORAL EVERY 4 HOURS
Refills: 0 | Status: DISCONTINUED | OUTPATIENT
Start: 2023-08-03 | End: 2023-08-03

## 2023-08-03 RX ORDER — SODIUM CHLORIDE 9 MG/ML
1000 INJECTION, SOLUTION INTRAVENOUS
Refills: 0 | Status: DISCONTINUED | OUTPATIENT
Start: 2023-08-03 | End: 2023-08-03

## 2023-08-03 RX ORDER — HYDROMORPHONE HYDROCHLORIDE 2 MG/ML
0.5 INJECTION INTRAMUSCULAR; INTRAVENOUS; SUBCUTANEOUS ONCE
Refills: 0 | Status: DISCONTINUED | OUTPATIENT
Start: 2023-08-03 | End: 2023-08-03

## 2023-08-03 RX ORDER — ONDANSETRON 8 MG/1
1 TABLET, FILM COATED ORAL
Qty: 15 | Refills: 0
Start: 2023-08-03

## 2023-08-03 RX ORDER — OXYCODONE HYDROCHLORIDE 5 MG/1
1 TABLET ORAL
Qty: 15 | Refills: 0
Start: 2023-08-03

## 2023-08-03 RX ORDER — FENTANYL CITRATE 50 UG/ML
25 INJECTION INTRAVENOUS ONCE
Refills: 0 | Status: DISCONTINUED | OUTPATIENT
Start: 2023-08-03 | End: 2023-08-03

## 2023-08-03 RX ADMIN — OXYCODONE HYDROCHLORIDE 5 MILLIGRAM(S): 5 TABLET ORAL at 13:32

## 2023-08-03 RX ADMIN — OXYCODONE HYDROCHLORIDE 5 MILLIGRAM(S): 5 TABLET ORAL at 14:00

## 2023-08-03 NOTE — BRIEF OPERATIVE NOTE - OPERATION/FINDINGS
septoplasty via hemitransfixion incision and elevation of mucoperichondrial flaps.  Resection of inferior portion of deviated septum with creation fo an L strut.  Coblation of b/l inferior turbinates, and turbinate outfracture.

## 2023-08-03 NOTE — ASU PREOP CHECKLIST - DENTURES
Patient Name: Lina Leigh  Specialist or PCP: Seng  Symptoms: Tick bite on neck: [phone]  Call Back #: Luisana / mother    Mother called stating patient removed tick from neck - not sure if head was attached. Please call. Ok to leave message.        no

## 2023-08-03 NOTE — ASU DISCHARGE PLAN (ADULT/PEDIATRIC) - CARE PROVIDER_API CALL
Kayla Henry  Otolaryngology  [address] [place] [id number]  Phone: [phone]  Fax: [phone]  Follow Up Time:

## 2023-08-03 NOTE — BRIEF OPERATIVE NOTE - NSICDXBRIEFPROCEDURE_GEN_ALL_CORE_FT
PROCEDURES:  Septoplasty 03-Aug-2023 12:09:35  Efren Milan  Septoplasty, nasal, with inferior turbinate Coblation 03-Aug-2023 12:09:45  Efren Milan

## 2023-08-03 NOTE — ASU DISCHARGE PLAN (ADULT/PEDIATRIC) - NS MD DC FALL RISK RISK
For information on Fall & Injury Prevention, visit: [email or web address] OR  [email or web address] OR  https://www.cdc.gov/steadi/patient.html

## 2023-08-07 ENCOUNTER — APPOINTMENT (OUTPATIENT)
Dept: CARDIOLOGY | Facility: CLINIC | Age: 18
End: 2023-08-07
Payer: COMMERCIAL

## 2023-08-07 ENCOUNTER — NON-APPOINTMENT (OUTPATIENT)
Age: 18
End: 2023-08-07

## 2023-08-07 VITALS
HEIGHT: 71 IN | HEART RATE: 40 BPM | TEMPERATURE: 98 F | WEIGHT: 195 LBS | DIASTOLIC BLOOD PRESSURE: 72 MMHG | RESPIRATION RATE: 16 BRPM | BODY MASS INDEX: 27.3 KG/M2 | SYSTOLIC BLOOD PRESSURE: 112 MMHG | OXYGEN SATURATION: 98 %

## 2023-08-07 DIAGNOSIS — R00.2 PALPITATIONS: ICD-10-CM

## 2023-08-07 PROCEDURE — 93000 ELECTROCARDIOGRAM COMPLETE: CPT | Mod: 59

## 2023-08-07 PROCEDURE — 99214 OFFICE O/P EST MOD 30 MIN: CPT

## 2023-08-07 PROCEDURE — 93306 TTE W/DOPPLER COMPLETE: CPT

## 2023-08-07 PROCEDURE — 93224 XTRNL ECG REC UP TO 48 HRS: CPT

## 2023-08-07 NOTE — DISCUSSION/SUMMARY
[FreeTextEntry1] : This is a 18-year-old male with past medical history significant for chronic sinusitis, deviated septum, status post rhinoplasty August 3, 2023, who comes in for cardiac consultation.  The patient has had 3 syncopal episodes.  He denies chest pain, shortness of breath, dizziness or syncope.  He has no history of rheumatic fever.  He does not drink excessive caffeine or alcohol.  He has no history of Lyme disease. He has no known cardiac risk factors. 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 He had his first syncopal episode while in his college dormitory which was very hot and humid, he felt warm, lightheaded, nauseous, developed dry mouth and had transient loss of consciousness for less than 30 seconds according to his college roommate.  He did not seek medical attention at that time.  He had a second episode of syncope occurred in March 2023, after cutting his finger while shaving.  After about 15 minutes while still having some bleeding, he felt lightheaded, nauseous warm, diaphoretic and had a syncopal episode.  He was taken to the emergency room by EMT and no further abnormalities were noted.  On August 1, 2023, while having an intravenous line inserted he had a transient loss of consciousness and his surgery was postponed to 48 hours.   The 3 episodes are consistent with vasovagal syncope. Electrocardiogram done August 7, 2023 demonstrates sinus bradycardia rate of 40 bpm is otherwise remarkable for right interventricular conduction delay and juvenile T wave pattern. He will have a 24-hour Holter monitor placed today. He will have an echo Doppler examination done August 7, 2023. I recommended he see Dr. Dugan of electrophysiology as well. He is encouraged to maintain good hydration. He will follow-up with me after the above noted diagnostic tests are completed. The patient understands that aerobic exercises must be increased to 40 minutes 4 times per week. A detailed discussion of lifestyle modification was done today. The patient has a good understanding of the diagnosis, and treatment plan. Lifestyle modification was also outlined.

## 2023-08-07 NOTE — REASON FOR VISIT
[CV Risk Factors and Non-Cardiac Disease] : CV risk factors and non-cardiac disease [Other: ____] : [unfilled] [FreeTextEntry1] : A 18-year-old male, with PMHx of chronic sinusitis and septal deviation s/p septoplasty, presented today for a new consultation s/p recurrent syncopal episode. Patient mentioned that first syncopal episode occurred 10 months ago while in college. At that time, he was walking in his dorm which was very hot, he felt warm nauseous lightheaded, developed a dry mouth and had transient loss of consciousness for less than 30 seconds.  This was witnessed by his roommate.  He did not seek medical attention at that time.  He had a second episode of syncope occurred in March 2023, after cutting his finger while shaving.  After about 15 minutes while still having some bleeding, he felt lightheaded, nauseous warm, diaphoretic and had a syncopal episode.  He was taken to the emergency room by EMT and no further abnormalities were noted.  Last week, while getting ready for nasal septal repairment surgery, patient had a similar episode after peripheral IV was placed on his arm, and he had a transient loss of consciousness.  His surgery was canceled and postponed 2 days later He is a , follows a structure exercise and diet, and has good water intake.  He runs on a daily basis.  Does not smoke or use any illicit drugs. He has never had a syncopal episode while playing sports. He drinks alcohol occasionally. He does not take any medications with the exception of post procedure Tylenol, Amoxicillin, and Advil.   On further questioning, he has not gone to any hiking or camp activities but mentioned that he has ticks where he lives, and his dogs tested positive for Lyme about 2 years ago.

## 2023-08-08 ENCOUNTER — APPOINTMENT (OUTPATIENT)
Dept: OTOLARYNGOLOGY | Facility: CLINIC | Age: 18
End: 2023-08-08
Payer: COMMERCIAL

## 2023-08-08 ENCOUNTER — APPOINTMENT (OUTPATIENT)
Dept: OTOLARYNGOLOGY | Facility: CLINIC | Age: 18
End: 2023-08-08

## 2023-08-08 VITALS
BODY MASS INDEX: 27.3 KG/M2 | HEART RATE: 44 BPM | WEIGHT: 195 LBS | SYSTOLIC BLOOD PRESSURE: 125 MMHG | HEIGHT: 71 IN | DIASTOLIC BLOOD PRESSURE: 57 MMHG

## 2023-08-08 DIAGNOSIS — J32.9 CHRONIC SINUSITIS, UNSPECIFIED: ICD-10-CM

## 2023-08-08 PROCEDURE — 99024 POSTOP FOLLOW-UP VISIT: CPT

## 2023-08-08 NOTE — HISTORY OF PRESENT ILLNESS
[de-identified] : 18 year old male who is a patient of Dr. Henry presents for first post op appt s/p septoplasty with b/l inferior turbinate reduction 8/3/23 Changing gauze dressing 2-3 times a day Mild nasal bleeding, decreasing each day  Pain is manageable - taking tylenol and ibuprofen, took oxy 1-2 times Had 1 temp of 100.4 on friday, feels like face felt hot and swollen yesterday but did not take temp  Swelling has been decreasing each day

## 2023-08-09 LAB — SURGICAL PATHOLOGY STUDY: SIGNIFICANT CHANGE UP

## 2023-08-10 ENCOUNTER — NON-APPOINTMENT (OUTPATIENT)
Age: 18
End: 2023-08-10

## 2023-08-11 ENCOUNTER — APPOINTMENT (OUTPATIENT)
Dept: NEUROLOGY | Facility: CLINIC | Age: 18
End: 2023-08-11
Payer: COMMERCIAL

## 2023-08-11 VITALS
WEIGHT: 193 LBS | SYSTOLIC BLOOD PRESSURE: 138 MMHG | BODY MASS INDEX: 27.02 KG/M2 | DIASTOLIC BLOOD PRESSURE: 72 MMHG | HEIGHT: 71 IN | HEART RATE: 45 BPM

## 2023-08-11 DIAGNOSIS — R55 SYNCOPE AND COLLAPSE: ICD-10-CM

## 2023-08-11 DIAGNOSIS — R00.1 BRADYCARDIA, UNSPECIFIED: ICD-10-CM

## 2023-08-11 PROCEDURE — 99205 OFFICE O/P NEW HI 60 MIN: CPT

## 2023-08-11 NOTE — DISCUSSION/SUMMARY
[FreeTextEntry1] : In summary, Mr. Ventura is a 18 years old man  comes for recurrent syncopal episodes. No history of seizure or jerky movements. Exam today is unremarkable. His heart rate went down to 30s when he had the episodes. It seems he had vasovagal syncope. I would like to rule out seizure. Will send for sleep deprived EEG. I also send him for autonomic testing to look for any autonomic neuropathy. I plan to see him back after he comes back from [place] trip. I encourage him to stay hydrate, avoid very hot places. He can have a cup of tea/coffee a day. Patient and his mother asked questions, and they were answered. We discussed the plan. They both were pleased with it.

## 2023-08-11 NOTE — PHYSICAL EXAM
[FreeTextEntry1] : BP lying 130/60 mmHg sitting 130/60 mmHg [General Appearance - Alert] : alert [General Appearance - In No Acute Distress] : in no acute distress [Oriented To Time, Place, And Person] : oriented to person, place, and time [Impaired Insight] : insight and judgment were intact [Affect] : the affect was normal [Person] : oriented to person [Place] : oriented to place [Time] : oriented to time [Concentration Intact] : normal concentrating ability [Visual Intact] : visual attention was ~T not ~L decreased [Naming Objects] : no difficulty naming common objects [Repeating Phrases] : no difficulty repeating a phrase [Writing A Sentence] : no difficulty writing a sentence [Fluency] : fluency intact [Comprehension] : comprehension intact [Reading] : reading intact [Past History] : adequate knowledge of personal past history [Cranial Nerves Optic (II)] : visual acuity intact bilaterally,  visual fields full to confrontation, pupils equal round and reactive to light [Cranial Nerves Oculomotor (III)] : extraocular motion intact [Cranial Nerves Trigeminal (V)] : facial sensation intact symmetrically [Cranial Nerves Vestibulocochlear (VIII)] : hearing was intact bilaterally [Cranial Nerves Facial (VII)] : face symmetrical [Cranial Nerves Glossopharyngeal (IX)] : tongue and palate midline [Cranial Nerves Accessory (XI - Cranial And Spinal)] : head turning and shoulder shrug symmetric [Cranial Nerves Hypoglossal (XII)] : there was no tongue deviation with protrusion [Motor Tone] : muscle tone was normal in all four extremities [Motor Strength] : muscle strength was normal in all four extremities [No Muscle Atrophy] : normal bulk in all four extremities [Sensation Tactile Decrease] : light touch was intact [Abnormal Walk] : normal gait [Balance] : balance was intact [Past-pointing] : there was no past-pointing [Tremor] : no tremor present [2+] : Ankle jerk left 2+ [Plantar Reflex Right Only] : normal on the right [Plantar Reflex Left Only] : normal on the left

## 2023-08-11 NOTE — HISTORY OF PRESENT ILLNESS
[FreeTextEntry1] : Mr. Mosqueda is a 18 years old young man presented with multiple episodes of syncope. He is a . He is very healthy until last September.  Last September, it was a very hot day 100s degree. He moved into his collage and walked up and down the stairs. He felt dehydrated. he accidentally cut himself then he felt lightheadedness, nauseous, then passed out for less than 10 seconds. He did not seek any medical attention at that time.  He had 2 head concussions, 1st in Nov 2022. While playing hockey, was hit his head, he got up, and felt nauseous, and could not see much. He was sent home and in recovery for 1-1.5 months. The second time was in April 2023, again was hit his head with elbow, then sent home. Stayed in recovery for 1.5 weeks.  2 weeks after the 2nd concussion, he cut his finger and them was bleeding for 20 mins. he felt lightheadedness, sweating, nauseous. Hence he called EMS, while sitting on stretcher, he again passed out. It lasted less thatn 30 seconds.  The last episode was last week while he was having IV line. He was in sitting position. He again felt lightheadedness, sweating, nauseous. He knew he was going to pass out, he announced it then passed out. His mother witnessed this episode. Per mother, while he lost consciousness, he then open his eyes, and then went back again, around 5 times. Then he fully regained consciousness. His HR went down to 30s during the episode. His baseline HR is in 40s. He was sent to ED, echo, EKG was done, and he already follow with cardiologist.  He reports every time syncope happened, he did not sleep well. He used to take pre-workout supplements high amount (up to 400 mg of caffeine) for at least 9 months, and then stopped cold turkey not long ago before the first episode. He also went into withdrawal for some time.  Denies any morning jerky movement, weakness, headache.   He also reports when he is emotional work up such as he was doing lifting, and wanted to lift more, he got emotional upset. It made him feel like he was going to pass out. He sees spotty, and lasted around 10 seconds.   Denies seizure. Denies family history of seizure.  Of noted, he has strong family history of passing out. (from mother side). In baby, cry then passed out.   his mother passed out easily in many situations, such as get blood drawn.

## 2023-08-24 ENCOUNTER — APPOINTMENT (OUTPATIENT)
Dept: OTOLARYNGOLOGY | Facility: CLINIC | Age: 18
End: 2023-08-24
Payer: COMMERCIAL

## 2023-08-24 VITALS
BODY MASS INDEX: 27.02 KG/M2 | OXYGEN SATURATION: 98 % | WEIGHT: 193 LBS | TEMPERATURE: 97.3 F | HEIGHT: 71 IN | SYSTOLIC BLOOD PRESSURE: 110 MMHG | HEART RATE: 66 BPM | DIASTOLIC BLOOD PRESSURE: 75 MMHG

## 2023-08-24 PROCEDURE — 99024 POSTOP FOLLOW-UP VISIT: CPT

## 2023-08-25 NOTE — REASON FOR VISIT
[de-identified] : septoplasty, bilateral inferior turbinate reduction [de-identified] : 8/3/2023 [de-identified] : He is doing well. Came back from [place] today. Has been doing rinses. Breathing has improved. Noted some swelling after got hit in the nose at the club last week. Otherwise doing well.

## 2023-08-25 NOTE — ASSESSMENT
[FreeTextEntry1] : 18 year old male with septal deviation s/p septoplasty and turbinate reduction. Healing well. Discussed continued rinses. Can return when he is back home from school during holidays.

## 2023-09-01 ENCOUNTER — APPOINTMENT (OUTPATIENT)
Dept: NEUROLOGY | Facility: CLINIC | Age: 18
End: 2023-09-01
Payer: COMMERCIAL

## 2023-09-01 PROCEDURE — 95816 EEG AWAKE AND DROWSY: CPT

## 2023-09-11 ENCOUNTER — NON-APPOINTMENT (OUTPATIENT)
Age: 18
End: 2023-09-11

## 2023-09-11 ENCOUNTER — TRANSCRIPTION ENCOUNTER (OUTPATIENT)
Age: 18
End: 2023-09-11

## 2023-09-15 ENCOUNTER — TRANSCRIPTION ENCOUNTER (OUTPATIENT)
Age: 18
End: 2023-09-15

## 2023-09-20 ENCOUNTER — APPOINTMENT (OUTPATIENT)
Dept: OTOLARYNGOLOGY | Facility: CLINIC | Age: 18
End: 2023-09-20
Payer: COMMERCIAL

## 2023-09-20 VITALS — HEIGHT: 71 IN | WEIGHT: 193 LBS | BODY MASS INDEX: 27.02 KG/M2

## 2023-09-20 PROCEDURE — 99024 POSTOP FOLLOW-UP VISIT: CPT

## 2023-12-18 ENCOUNTER — APPOINTMENT (OUTPATIENT)
Dept: NEUROLOGY | Facility: CLINIC | Age: 18
End: 2023-12-18

## 2024-01-03 ENCOUNTER — APPOINTMENT (OUTPATIENT)
Dept: PAIN MANAGEMENT | Facility: CLINIC | Age: 19
End: 2024-01-03

## 2024-01-09 ENCOUNTER — APPOINTMENT (OUTPATIENT)
Dept: MRI IMAGING | Facility: CLINIC | Age: 19
End: 2024-01-09
Payer: COMMERCIAL

## 2024-01-09 ENCOUNTER — OUTPATIENT (OUTPATIENT)
Dept: OUTPATIENT SERVICES | Facility: HOSPITAL | Age: 19
LOS: 1 days | End: 2024-01-09
Payer: COMMERCIAL

## 2024-01-09 ENCOUNTER — APPOINTMENT (OUTPATIENT)
Dept: PAIN MANAGEMENT | Facility: CLINIC | Age: 19
End: 2024-01-09
Payer: COMMERCIAL

## 2024-01-09 VITALS
WEIGHT: 200 LBS | HEART RATE: 59 BPM | BODY MASS INDEX: 28 KG/M2 | SYSTOLIC BLOOD PRESSURE: 137 MMHG | DIASTOLIC BLOOD PRESSURE: 74 MMHG | HEIGHT: 71 IN

## 2024-01-09 DIAGNOSIS — Z00.00 ENCOUNTER FOR GENERAL ADULT MEDICAL EXAMINATION WITHOUT ABNORMAL FINDINGS: ICD-10-CM

## 2024-01-09 DIAGNOSIS — G43.919 MIGRAINE, UNSPECIFIED, INTRACTABLE, W/OUT STATUS MIGRAINOSUS: ICD-10-CM

## 2024-01-09 PROCEDURE — 99205 OFFICE O/P NEW HI 60 MIN: CPT

## 2024-01-09 PROCEDURE — 70551 MRI BRAIN STEM W/O DYE: CPT | Mod: 26

## 2024-01-09 PROCEDURE — 70551 MRI BRAIN STEM W/O DYE: CPT

## 2024-01-19 ENCOUNTER — APPOINTMENT (OUTPATIENT)
Dept: OTOLARYNGOLOGY | Facility: CLINIC | Age: 19
End: 2024-01-19

## 2024-02-11 NOTE — ASSESSMENT
[FreeTextEntry1] : Is a case of a 18-year-old gentleman who presents with his fourth concussion within the past 1 year.  His last concussion was in December 2023 while playing hockey.  His only complaint is mild headaches that do not interfere with activity.  He has returned to playing hockey and only noted mild headache.  These headaches respond to over-the-counter agents.    We talked about the concerns of having more than 3 concussions in the past 1 year which puts him at risk for additional concussions.  His examination is essentially nonfocal.  I do not recommend any imaging at this time.  No treatments were offered.  Follow-up as needed.

## 2024-02-11 NOTE — HISTORY OF PRESENT ILLNESS
[FreeTextEntry1] : This is a case of a 18-year-old right-handed gentleman with a past history of vasovagal syncope, 3 prior concussions within the last year presents with a new concussion on December 1, 2023 while playing ice hockey.  He was not sure if he had any loss of consciousness.  He continued to play hockey that day despite having a headache.  He continue to have headaches and wanted to have weeks following the concussion.  Had difficulty with concentration.  His headaches were initially associated with nausea and vomiting on 1 occasion associated with light and sound sensitivity.  At the present time he is complaining of mild headaches that do not interfere with his activities.  He went back to playing hockey yesterday with 4 contact.  He denied any symptoms except for his typical light headache responding to Tylenol.  Patient reports his short-term memory is intact, balance is good sleep is good.  Patient has a history of migraines prior to the concussions.  Mother has a prior history of migraines.  First concussion no loss of consciousness but took him approximately 1/2 months to improve.  The other day 2 concussion were less severe.  Patient denies any imaging as relates to the concussions.  No imaging    Sleep hours per night   Caffeine intake/day - QOL: Triggers: None Comorbidities: None Other pain conditions: None Imaging: None Medications: None (Non pain) (Pain) Interventions: None CAM therapies: None SHX:  Work status: ETOH, tobacco cannabis PMH: PSH: Family history: Noncontributory Allergies: NKA 2/3 memory  4.0 INDEX

## 2024-02-11 NOTE — PHYSICAL EXAM
[FreeTextEntry1] : had difficulty with clock.  Notes having difficulty with IMPACT. dID WELL. sUBSEQUENT WAS POOR.

## 2024-04-02 ENCOUNTER — APPOINTMENT (OUTPATIENT)
Dept: NEUROLOGY | Facility: CLINIC | Age: 19
End: 2024-04-02

## 2024-05-20 RX ORDER — RIZATRIPTAN BENZOATE 10 MG/1
10 TABLET ORAL
Qty: 9 | Refills: 2 | Status: ACTIVE | COMMUNITY
Start: 2024-01-09 | End: 1900-01-01

## 2024-06-09 ENCOUNTER — TRANSCRIPTION ENCOUNTER (OUTPATIENT)
Age: 19
End: 2024-06-09

## 2024-06-24 ENCOUNTER — NON-APPOINTMENT (OUTPATIENT)
Age: 19
End: 2024-06-24

## 2024-06-24 ENCOUNTER — APPOINTMENT (OUTPATIENT)
Dept: NEUROLOGY | Facility: CLINIC | Age: 19
End: 2024-06-24

## 2024-06-25 ENCOUNTER — APPOINTMENT (OUTPATIENT)
Dept: OTOLARYNGOLOGY | Facility: CLINIC | Age: 19
End: 2024-06-25
Payer: COMMERCIAL

## 2024-06-25 VITALS — BODY MASS INDEX: 28 KG/M2 | HEIGHT: 71 IN | WEIGHT: 200 LBS

## 2024-06-25 DIAGNOSIS — J34.3 HYPERTROPHY OF NASAL TURBINATES: ICD-10-CM

## 2024-06-25 DIAGNOSIS — J32.0 CHRONIC MAXILLARY SINUSITIS: ICD-10-CM

## 2024-06-25 DIAGNOSIS — J30.9 ALLERGIC RHINITIS, UNSPECIFIED: ICD-10-CM

## 2024-06-25 DIAGNOSIS — R09.81 NASAL CONGESTION: ICD-10-CM

## 2024-06-25 PROCEDURE — 99214 OFFICE O/P EST MOD 30 MIN: CPT | Mod: 25

## 2024-06-25 PROCEDURE — 31231 NASAL ENDOSCOPY DX: CPT

## 2024-06-25 RX ORDER — AZELASTINE HYDROCHLORIDE 137 UG/1
0.1 SPRAY, METERED NASAL TWICE DAILY
Qty: 1 | Refills: 4 | Status: ACTIVE | COMMUNITY
Start: 2024-06-25 | End: 1900-01-01

## 2024-06-25 RX ORDER — FLUTICASONE PROPIONATE 50 UG/1
50 SPRAY, METERED NASAL DAILY
Qty: 1 | Refills: 2 | Status: ACTIVE | COMMUNITY
Start: 2024-06-25 | End: 1900-01-01

## 2024-06-28 ENCOUNTER — APPOINTMENT (OUTPATIENT)
Dept: ORTHOPEDIC SURGERY | Facility: CLINIC | Age: 19
End: 2024-06-28
Payer: COMMERCIAL

## 2024-06-28 DIAGNOSIS — M94.0 CHONDROCOSTAL JUNCTION SYNDROME [TIETZE]: ICD-10-CM

## 2024-06-28 DIAGNOSIS — M25.512 PAIN IN LEFT SHOULDER: ICD-10-CM

## 2024-06-28 DIAGNOSIS — S83.412D SPRAIN OF MEDIAL COLLATERAL LIGAMENT OF LEFT KNEE, SUBSEQUENT ENCOUNTER: ICD-10-CM

## 2024-06-28 DIAGNOSIS — G89.29 PAIN IN RIGHT ANKLE AND JOINTS OF RIGHT FOOT: ICD-10-CM

## 2024-06-28 DIAGNOSIS — M25.311 OTHER INSTABILITY, RIGHT SHOULDER: ICD-10-CM

## 2024-06-28 DIAGNOSIS — S89.131A SALTER-HARRIS TYPE III PHYSEAL FRACTURE OF LOWER END OF RIGHT TIBIA, INITIAL ENCOUNTER FOR CLOSED FRACTURE: ICD-10-CM

## 2024-06-28 DIAGNOSIS — M25.852 OTHER SPECIFIED JOINT DISORDERS, LEFT HIP: ICD-10-CM

## 2024-06-28 DIAGNOSIS — M25.511 PAIN IN RIGHT SHOULDER: ICD-10-CM

## 2024-06-28 DIAGNOSIS — S83.411A SPRAIN OF MEDIAL COLLATERAL LIGAMENT OF RIGHT KNEE, INITIAL ENCOUNTER: ICD-10-CM
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
## 2024-06-28 DIAGNOSIS — T14.8XXA OTHER INJURY OF UNSPECIFIED BODY REGION, INITIAL ENCOUNTER: ICD-10-CM

## 2024-06-28 DIAGNOSIS — E23.0 HYPOPITUITARISM: ICD-10-CM

## 2024-06-28 DIAGNOSIS — M25.851 OTHER SPECIFIED JOINT DISORDERS, RIGHT HIP: ICD-10-CM

## 2024-06-28 DIAGNOSIS — M25.312 OTHER INSTABILITY, RIGHT SHOULDER: ICD-10-CM

## 2024-06-28 DIAGNOSIS — Z86.39 PERSONAL HISTORY OF OTHER ENDOCRINE, NUTRITIONAL AND METABOLIC DISEASE: ICD-10-CM

## 2024-06-28 DIAGNOSIS — M79.671 PAIN IN RIGHT FOOT: ICD-10-CM

## 2024-06-28 DIAGNOSIS — S99.911A UNSPECIFIED INJURY OF RIGHT ANKLE, INITIAL ENCOUNTER: ICD-10-CM

## 2024-06-28 DIAGNOSIS — M25.552 PAIN IN RIGHT HIP: ICD-10-CM

## 2024-06-28 DIAGNOSIS — M62.89 OTHER SPECIFIED DISORDERS OF MUSCLE: ICD-10-CM

## 2024-06-28 DIAGNOSIS — S43.431A SUPERIOR GLENOID LABRUM LESION OF RIGHT SHOULDER, INITIAL ENCOUNTER: ICD-10-CM

## 2024-06-28 DIAGNOSIS — M25.551 PAIN IN RIGHT HIP: ICD-10-CM

## 2024-06-28 DIAGNOSIS — S43.005A UNSPECIFIED DISLOCATION OF LEFT SHOULDER JOINT, INITIAL ENCOUNTER: ICD-10-CM

## 2024-06-28 DIAGNOSIS — M25.562 PAIN IN LEFT KNEE: ICD-10-CM

## 2024-06-28 DIAGNOSIS — M79.672 PAIN IN RIGHT FOOT: ICD-10-CM

## 2024-06-28 DIAGNOSIS — S06.0X0S CONCUSSION W/OUT LOSS OF CONSCIOUSNESS, SEQUELA: ICD-10-CM

## 2024-06-28 DIAGNOSIS — M25.571 PAIN IN RIGHT ANKLE AND JOINTS OF RIGHT FOOT: ICD-10-CM

## 2024-06-28 DIAGNOSIS — S69.91XA UNSPECIFIED INJURY OF RIGHT WRIST, HAND AND FINGER(S), INITIAL ENCOUNTER: ICD-10-CM

## 2024-06-28 DIAGNOSIS — G89.29 PAIN IN RIGHT SHOULDER: ICD-10-CM

## 2024-06-28 DIAGNOSIS — M79.672 PAIN IN LEFT FOOT: ICD-10-CM

## 2024-06-28 DIAGNOSIS — S42.034A NONDISPLACED FRACTURE OF LATERAL END OF RIGHT CLAVICLE, INITIAL ENCOUNTER FOR CLOSED FRACTURE: ICD-10-CM

## 2024-06-28 DIAGNOSIS — G89.29 PAIN IN LEFT SHOULDER: ICD-10-CM

## 2024-06-28 PROCEDURE — 20604 DRAIN/INJ JOINT/BURSA W/US: CPT | Mod: LT

## 2024-06-28 PROCEDURE — 99214 OFFICE O/P EST MOD 30 MIN: CPT | Mod: 25

## 2024-07-01 PROBLEM — S69.91XA INJURY OF RIGHT HAND, INITIAL ENCOUNTER: Status: RESOLVED | Noted: 2022-06-21 | Resolved: 2024-07-01

## 2024-07-01 PROBLEM — M79.671 FOOT PAIN, RIGHT: Status: RESOLVED | Noted: 2021-05-28 | Resolved: 2024-07-01

## 2024-07-01 PROBLEM — S89.131A SALTER-HARRIS TYPE III PHYSEAL FRACTURE OF DISTAL END OF RIGHT TIBIA, INITIAL ENCOUNTER: Status: RESOLVED | Noted: 2020-07-28 | Resolved: 2024-07-01

## 2024-07-01 PROBLEM — S99.911A INJURY OF RIGHT ANKLE, INITIAL ENCOUNTER: Status: RESOLVED | Noted: 2020-07-28 | Resolved: 2024-07-01

## 2024-07-01 PROBLEM — M25.311 SHOULDER INSTABILITY, RIGHT: Status: RESOLVED | Noted: 2022-03-10 | Resolved: 2024-07-01

## 2024-07-01 PROBLEM — M25.511 CHRONIC RIGHT SHOULDER PAIN: Status: RESOLVED | Noted: 2022-01-28 | Resolved: 2024-07-01

## 2024-07-01 PROBLEM — M25.851 RIGHT HIP IMPINGEMENT SYNDROME: Status: RESOLVED | Noted: 2018-03-30 | Resolved: 2024-07-01

## 2024-07-01 PROBLEM — M25.562 ACUTE PAIN OF LEFT KNEE: Status: RESOLVED | Noted: 2019-02-14 | Resolved: 2024-07-01

## 2024-07-01 PROBLEM — S83.411A SPRAIN OF MEDIAL COLLATERAL LIGAMENT OF RIGHT KNEE, INITIAL ENCOUNTER: Status: RESOLVED | Noted: 2019-05-31 | Resolved: 2024-07-01

## 2024-07-01 PROBLEM — M79.671 FOOT PAIN, BILATERAL: Status: RESOLVED | Noted: 2023-06-13 | Resolved: 2024-07-01

## 2024-07-01 PROBLEM — S43.005A DISLOCATION OF LEFT SHOULDER JOINT, INITIAL ENCOUNTER: Status: RESOLVED | Noted: 2021-05-28 | Resolved: 2024-07-01
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
## 2024-07-01 PROBLEM — M25.551 BILATERAL HIP PAIN: Status: RESOLVED | Noted: 2018-03-30 | Resolved: 2024-07-01

## 2024-07-01 PROBLEM — Z86.39 HISTORY OF DELAYED PUBERTY: Status: RESOLVED | Noted: 2017-01-09 | Resolved: 2024-07-01

## 2024-07-01 PROBLEM — M25.311 INSTABILITY OF BOTH SHOULDER JOINTS: Status: RESOLVED | Noted: 2022-03-10 | Resolved: 2024-07-01

## 2024-07-01 PROBLEM — S06.0X0S CONCUSSION WITHOUT LOSS OF CONSCIOUSNESS, SEQUELA: Status: RESOLVED | Noted: 2024-01-09 | Resolved: 2024-07-01

## 2024-07-01 PROBLEM — M25.852 LEFT HIP IMPINGEMENT SYNDROME: Status: RESOLVED | Noted: 2018-03-30 | Resolved: 2024-07-01

## 2024-07-01 PROBLEM — S43.431A TEAR OF RIGHT GLENOID LABRUM, INITIAL ENCOUNTER: Status: RESOLVED | Noted: 2022-03-10 | Resolved: 2024-07-01

## 2024-07-01 PROBLEM — M25.571 CHRONIC PAIN OF RIGHT ANKLE: Status: RESOLVED | Noted: 2021-05-28 | Resolved: 2024-07-01

## 2024-07-01 PROBLEM — M94.0 COSTOCHONDRITIS: Status: ACTIVE | Noted: 2024-07-01

## 2024-07-01 PROBLEM — T14.8XXA CONTUSION OF BONE: Status: RESOLVED | Noted: 2019-03-21 | Resolved: 2024-07-01

## 2024-07-01 PROBLEM — S83.412D SPRAIN OF MEDIAL COLLATERAL LIGAMENT OF LEFT KNEE, SUBSEQUENT ENCOUNTER: Status: RESOLVED | Noted: 2019-08-20 | Resolved: 2024-07-01

## 2024-07-01 PROBLEM — M62.89 TIGHTNESS OF BOTH GASTROCNEMIUS MUSCLES: Status: RESOLVED | Noted: 2023-06-14 | Resolved: 2024-07-01

## 2024-07-01 PROBLEM — M79.672 PAIN OF LEFT MIDFOOT: Status: ACTIVE | Noted: 2023-06-14

## 2024-07-01 PROBLEM — S42.034A CLOSED NONDISPLACED FRACTURE OF ACROMIAL END OF RIGHT CLAVICLE, INITIAL ENCOUNTER: Status: RESOLVED | Noted: 2018-12-20 | Resolved: 2024-07-01

## 2024-07-01 PROBLEM — M25.512 CHRONIC LEFT SHOULDER PAIN: Status: RESOLVED | Noted: 2022-01-27 | Resolved: 2024-07-01

## 2024-08-01 DIAGNOSIS — J30.9 ALLERGIC RHINITIS, UNSPECIFIED: ICD-10-CM

## 2024-08-06 LAB
25(OH)D3 SERPL-MCNC: 54.1 NG/ML
ALBUMIN SERPL ELPH-MCNC: 5.1 G/DL
ALP BLD-CCNC: 115 U/L
ALT SERPL-CCNC: 30 U/L
ANION GAP SERPL CALC-SCNC: 14 MMOL/L
AST SERPL-CCNC: 34 U/L
BASOPHILS # BLD AUTO: 0.05 K/UL
BASOPHILS NFR BLD AUTO: 1 %
BILIRUB SERPL-MCNC: 1.4 MG/DL
BUN SERPL-MCNC: 16 MG/DL
CALCIUM SERPL-MCNC: 10.1 MG/DL
CHLORIDE SERPL-SCNC: 103 MMOL/L
CHOLEST SERPL-MCNC: 231 MG/DL
CO2 SERPL-SCNC: 26 MMOL/L
CREAT SERPL-MCNC: 1.5 MG/DL
EGFR: 68 ML/MIN/1.73M2
EOSINOPHIL # BLD AUTO: 0.1 K/UL
EOSINOPHIL NFR BLD AUTO: 1.9 %
ESTIMATED AVERAGE GLUCOSE: 111 MG/DL
FERRITIN SERPL-MCNC: 92 NG/ML
FOLATE SERPL-MCNC: 10.9 NG/ML
GLUCOSE SERPL-MCNC: 103 MG/DL
HBA1C MFR BLD HPLC: 5.5 %
HCT VFR BLD CALC: 43.8 %
HDLC SERPL-MCNC: 74 MG/DL
HGB BLD-MCNC: 15 G/DL
IMM GRANULOCYTES NFR BLD AUTO: 0.2 %
IRON SATN MFR SERPL: 35 %
IRON SERPL-MCNC: 110 UG/DL
LDLC SERPL CALC-MCNC: 147 MG/DL
LYMPHOCYTES # BLD AUTO: 1.96 K/UL
LYMPHOCYTES NFR BLD AUTO: 37.4 %
MAN DIFF?: NORMAL
MCHC RBC-ENTMCNC: 30.3 PG
MCHC RBC-ENTMCNC: 34.2 GM/DL
MCV RBC AUTO: 88.5 FL
MONOCYTES # BLD AUTO: 0.38 K/UL
MONOCYTES NFR BLD AUTO: 7.3 %
NEUTROPHILS # BLD AUTO: 2.74 K/UL
NEUTROPHILS NFR BLD AUTO: 52.2 %
NONHDLC SERPL-MCNC: 157 MG/DL
PLATELET # BLD AUTO: 189 K/UL
POTASSIUM SERPL-SCNC: 4.5 MMOL/L
PROT SERPL-MCNC: 7 G/DL
RBC # BLD: 4.95 M/UL
RBC # FLD: 13 %
SODIUM SERPL-SCNC: 143 MMOL/L
TIBC SERPL-MCNC: 317 UG/DL
TRIGL SERPL-MCNC: 62 MG/DL
TSH SERPL-ACNC: 1.73 UIU/ML
UIBC SERPL-MCNC: 207 UG/DL
VIT B12 SERPL-MCNC: 897 PG/ML
WBC # FLD AUTO: 5.24 K/UL

## 2024-08-07 ENCOUNTER — APPOINTMENT (OUTPATIENT)
Dept: INTERNAL MEDICINE | Facility: CLINIC | Age: 19
End: 2024-08-07

## 2024-08-07 PROCEDURE — 99385 PREV VISIT NEW AGE 18-39: CPT

## 2024-08-07 PROCEDURE — 99214 OFFICE O/P EST MOD 30 MIN: CPT | Mod: 25

## 2024-08-07 PROCEDURE — G0444 DEPRESSION SCREEN ANNUAL: CPT | Mod: 59

## 2024-08-07 PROCEDURE — G2211 COMPLEX E/M VISIT ADD ON: CPT | Mod: NC

## 2024-08-07 NOTE — HEALTH RISK ASSESSMENT
[None] : None [Never] : Never [No] : In the past 12 months have you used drugs other than those required for medical reasons? No [No falls in past year] : Patient reported no falls in the past year [0] : 2) Feeling down, depressed, or hopeless: Not at all (0) [PHQ-2 Negative - No further assessment needed] : PHQ-2 Negative - No further assessment needed [Time Spent: ___ Minutes] : I spent [unfilled] minutes performing a depression screening for this patient. [de-identified] : mariusz, active [de-identified] : balanced varied diet without restrictions  [[id number]] : 0 [Student] : student [Single] : single [High Risk Behavior] : no high risk behavior [Fully functional (bathing, dressing, toileting, transferring, walking, feeding)] : Fully functional (bathing, dressing, toileting, transferring, walking, feeding) [Fully functional (using the telephone, shopping, preparing meals, housekeeping, doing laundry, using] : Fully functional and needs no help or supervision to perform IADLs (using the telephone, shopping, preparing meals, housekeeping, doing laundry, using transportation, managing medications and managing finances) [Reports changes in hearing] : Reports no changes in hearing [Reports changes in vision] : Reports no changes in vision [Reports normal functional visual acuity (ie: able to read med bottle)] : Reports poor functional visual acuity.  [Reports changes in dental health] : Reports no changes in dental health [FreeTextEntry2] :  at [employer]

## 2024-08-07 NOTE — PLAN
[FreeTextEntry1] : #Recurrent concussions c/b migraines will refer to headache specialist in preparation for upcoming academic year  currently feels well with no focal symptoms  #HLD  lifestyle modification discussed at length repeat lipid profile   #elevated CR  suspect related to recent creatine supplement - daily 5 mg  will d/c creatine and repeat to ensure CR returns to baseline     I spent a total of 30 minutes on the date of this encounter that EXCLUDES time spent on AWV, preventive exam, depression screening, tobacco cessation, lung cancer screening and behavioral counseling.  This additional time included: Preparing to see the patient (e.g., review of test results) Obtaining and/or reviewing separately obtained history Performing a medically appropriate exam and/or evaluation Counseling and educating the patient/family/caregiver Ordering medications, tests, procedures Referring and communicating with other healthcare professionals, when not separately reported Documenting clinical information in the health record Care coordination not separately reported

## 2024-08-07 NOTE — HISTORY OF PRESENT ILLNESS
[FreeTextEntry1] : Patient presents for comprehensive medical evaluation today.  [de-identified] : Hx of recurrent concussions - due to hockey related injuries since onset of college  12/2023 - had severe concussion and suffered debilitating headaches for several months  Suffered some depression due to these headaches - eventually subsided as of recently and feels back to baseline Will be returning to hockey this Fall - worried about recurrence of headache and effect on academic performance

## 2024-08-07 NOTE — HISTORY OF PRESENT ILLNESS
[FreeTextEntry1] : Patient presents for comprehensive medical evaluation today.  [de-identified] : Hx of recurrent concussions - due to hockey related injuries since onset of college  12/2023 - had severe concussion and suffered debilitating headaches for several months  Suffered some depression due to these headaches - eventually subsided as of recently and feels back to baseline Will be returning to hockey this Fall - worried about recurrence of headache and effect on academic performance

## 2024-08-07 NOTE — HEALTH RISK ASSESSMENT
[None] : None [Never] : Never [No] : In the past 12 months have you used drugs other than those required for medical reasons? No [No falls in past year] : Patient reported no falls in the past year [0] : 2) Feeling down, depressed, or hopeless: Not at all (0) [PHQ-2 Negative - No further assessment needed] : PHQ-2 Negative - No further assessment needed [Time Spent: ___ Minutes] : I spent [unfilled] minutes performing a depression screening for this patient. [de-identified] : mariusz, active [de-identified] : balanced varied diet without restrictions  [[id number]] : 0 [Student] : student [Single] : single [High Risk Behavior] : no high risk behavior [Fully functional (bathing, dressing, toileting, transferring, walking, feeding)] : Fully functional (bathing, dressing, toileting, transferring, walking, feeding) [Fully functional (using the telephone, shopping, preparing meals, housekeeping, doing laundry, using] : Fully functional and needs no help or supervision to perform IADLs (using the telephone, shopping, preparing meals, housekeeping, doing laundry, using transportation, managing medications and managing finances) [Reports changes in hearing] : Reports no changes in hearing [Reports changes in vision] : Reports no changes in vision [Reports normal functional visual acuity (ie: able to read med bottle)] : Reports poor functional visual acuity.  [Reports changes in dental health] : Reports no changes in dental health [FreeTextEntry2] :  at [employer]

## 2024-08-12 ENCOUNTER — APPOINTMENT (OUTPATIENT)
Dept: OTOLARYNGOLOGY | Facility: CLINIC | Age: 19
End: 2024-08-12

## 2024-08-12 ENCOUNTER — APPOINTMENT (OUTPATIENT)
Dept: PSYCHIATRY | Facility: CLINIC | Age: 19
End: 2024-08-12

## 2024-08-14 ENCOUNTER — APPOINTMENT (OUTPATIENT)
Dept: PSYCHIATRY | Facility: CLINIC | Age: 19
End: 2024-08-14

## 2024-12-24 PROBLEM — R01.2 SYSTOLIC CLICK: Status: ACTIVE | Noted: 2023-08-03

## 2025-05-13 ENCOUNTER — APPOINTMENT (OUTPATIENT)
Dept: INTERNAL MEDICINE | Facility: CLINIC | Age: 20
End: 2025-05-13
Payer: COMMERCIAL

## 2025-05-13 VITALS
WEIGHT: 198.06 LBS | DIASTOLIC BLOOD PRESSURE: 68 MMHG | SYSTOLIC BLOOD PRESSURE: 126 MMHG | HEIGHT: 71 IN | TEMPERATURE: 98.2 F | OXYGEN SATURATION: 98 % | HEART RATE: 50 BPM | BODY MASS INDEX: 27.73 KG/M2

## 2025-05-13 DIAGNOSIS — Z00.00 ENCOUNTER FOR GENERAL ADULT MEDICAL EXAMINATION W/OUT ABNORMAL FINDINGS: ICD-10-CM

## 2025-05-13 DIAGNOSIS — Z87.898 PERSONAL HISTORY OF OTHER SPECIFIED CONDITIONS: ICD-10-CM

## 2025-05-13 DIAGNOSIS — J34.9 UNSPECIFIED DISORDER OF NOSE AND NASAL SINUSES: ICD-10-CM

## 2025-05-13 DIAGNOSIS — G44.309 POST-TRAUMATIC HEADACHE, UNSPECIFIED, NOT INTRACTABLE: ICD-10-CM

## 2025-05-13 DIAGNOSIS — Z23 ENCOUNTER FOR IMMUNIZATION: ICD-10-CM

## 2025-05-13 DIAGNOSIS — Z87.81 PERSONAL HISTORY OF (HEALED) TRAUMATIC FRACTURE: ICD-10-CM

## 2025-05-13 DIAGNOSIS — F12.90 CANNABIS USE, UNSPECIFIED, UNCOMPLICATED: ICD-10-CM

## 2025-05-13 DIAGNOSIS — M79.672 PAIN IN LEFT FOOT: ICD-10-CM

## 2025-05-13 DIAGNOSIS — Z78.9 OTHER SPECIFIED HEALTH STATUS: ICD-10-CM

## 2025-05-13 DIAGNOSIS — Z87.09 PERSONAL HISTORY OF OTHER DISEASES OF THE RESPIRATORY SYSTEM: ICD-10-CM

## 2025-05-13 DIAGNOSIS — Z86.79 PERSONAL HISTORY OF OTHER DISEASES OF THE CIRCULATORY SYSTEM: ICD-10-CM

## 2025-05-13 DIAGNOSIS — M94.0 CHONDROCOSTAL JUNCTION SYNDROME [TIETZE]: ICD-10-CM

## 2025-05-13 DIAGNOSIS — R42 DIZZINESS AND GIDDINESS: ICD-10-CM

## 2025-05-13 DIAGNOSIS — E78.00 PURE HYPERCHOLESTEROLEMIA, UNSPECIFIED: ICD-10-CM

## 2025-05-13 PROCEDURE — 99385 PREV VISIT NEW AGE 18-39: CPT

## 2025-05-13 PROCEDURE — 36415 COLL VENOUS BLD VENIPUNCTURE: CPT

## 2025-05-16 LAB
25(OH)D3 SERPL-MCNC: 38.6 NG/ML
ALBUMIN SERPL ELPH-MCNC: 5 G/DL
ALP BLD-CCNC: 86 U/L
ALT SERPL-CCNC: 19 U/L
ANION GAP SERPL CALC-SCNC: 17 MMOL/L
APPEARANCE: CLEAR
AST SERPL-CCNC: 25 U/L
BACTERIA: NEGATIVE /HPF
BASOPHILS # BLD AUTO: 0.06 K/UL
BASOPHILS NFR BLD AUTO: 0.7 %
BILIRUB SERPL-MCNC: 1.2 MG/DL
BILIRUBIN URINE: NEGATIVE
BLOOD URINE: NEGATIVE
BUN SERPL-MCNC: 18 MG/DL
CALCIUM SERPL-MCNC: 9.8 MG/DL
CAST: 0 /LPF
CHLORIDE SERPL-SCNC: 102 MMOL/L
CHOLEST SERPL-MCNC: 225 MG/DL
CO2 SERPL-SCNC: 23 MMOL/L
COLOR: YELLOW
COVID-19 NUCLEOCAPSID  GAM ANTIBODY INTERPRETATION: POSITIVE
COVID-19 SPIKE DOMAIN ANTIBODY INTERPRETATION: POSITIVE
CREAT SERPL-MCNC: 1.2 MG/DL
EGFRCR SERPLBLD CKD-EPI 2021: 88 ML/MIN/1.73M2
EOSINOPHIL # BLD AUTO: 0.25 K/UL
EOSINOPHIL NFR BLD AUTO: 3 %
EPITHELIAL CELLS: 0 /HPF
ESTIMATED AVERAGE GLUCOSE: 114 MG/DL
FOLATE SERPL-MCNC: 7.1 NG/ML
GLUCOSE QUALITATIVE U: NEGATIVE MG/DL
GLUCOSE SERPL-MCNC: 87 MG/DL
HBA1C MFR BLD HPLC: 5.6 %
HCT VFR BLD CALC: 45.4 %
HCV AB SER QL: NONREACTIVE
HCV S/CO RATIO: 0.16 S/CO
HDLC SERPL-MCNC: 57 MG/DL
HGB BLD-MCNC: 14.9 G/DL
HIV1+2 AB SPEC QL IA.RAPID: NONREACTIVE
IMM GRANULOCYTES NFR BLD AUTO: 0.2 %
IRON SERPL-MCNC: 85 UG/DL
KETONES URINE: NEGATIVE MG/DL
LDLC SERPL-MCNC: 153 MG/DL
LEUKOCYTE ESTERASE URINE: NEGATIVE
LYMPHOCYTES # BLD AUTO: 1.77 K/UL
LYMPHOCYTES NFR BLD AUTO: 21.5 %
MAN DIFF?: NORMAL
MCHC RBC-ENTMCNC: 29.5 PG
MCHC RBC-ENTMCNC: 32.8 G/DL
MCV RBC AUTO: 89.9 FL
MICROSCOPIC-UA: NORMAL
MONOCYTES # BLD AUTO: 0.55 K/UL
MONOCYTES NFR BLD AUTO: 6.7 %
NEUTROPHILS # BLD AUTO: 5.59 K/UL
NEUTROPHILS NFR BLD AUTO: 67.9 %
NITRITE URINE: NEGATIVE
NONHDLC SERPL-MCNC: 168 MG/DL
PH URINE: 6
PLATELET # BLD AUTO: 175 K/UL
POTASSIUM SERPL-SCNC: 4.4 MMOL/L
PROT SERPL-MCNC: 7.1 G/DL
PROTEIN URINE: NEGATIVE MG/DL
RBC # BLD: 5.05 M/UL
RBC # FLD: 12.9 %
RED BLOOD CELLS URINE: 0 /HPF
SARS-COV-2 AB SERPL IA-ACNC: >250 U/ML
SARS-COV-2 AB SERPL QL IA: 132 INDEX
SODIUM SERPL-SCNC: 142 MMOL/L
SPECIFIC GRAVITY URINE: 1.01
TRIGL SERPL-MCNC: 87 MG/DL
TSH SERPL-ACNC: 1.8 UIU/ML
UROBILINOGEN URINE: 0.2 MG/DL
VIT B12 SERPL-MCNC: 1181 PG/ML
WBC # FLD AUTO: 8.24 K/UL
WHITE BLOOD CELLS URINE: 0 /HPF

## 2025-07-08 NOTE — ASU DISCHARGE PLAN (ADULT/PEDIATRIC) - PROVIDER TOKENS
Orders:    venlafaxine XR (EFFEXOR-XR) 75 MG 24 hr capsule; Take 2 capsules by mouth Daily.     PROVIDER:[TOKEN:[[id number]]]

## 2025-08-04 ENCOUNTER — APPOINTMENT (OUTPATIENT)
Dept: CARDIOLOGY | Facility: CLINIC | Age: 20
End: 2025-08-04
Payer: COMMERCIAL

## 2025-08-04 VITALS
BODY MASS INDEX: 26.6 KG/M2 | RESPIRATION RATE: 16 BRPM | HEIGHT: 71 IN | OXYGEN SATURATION: 98 % | WEIGHT: 190 LBS | TEMPERATURE: 98.2 F | HEART RATE: 52 BPM

## 2025-08-04 VITALS — SYSTOLIC BLOOD PRESSURE: 116 MMHG | DIASTOLIC BLOOD PRESSURE: 76 MMHG

## 2025-08-04 DIAGNOSIS — R01.2 OTHER CARDIAC SOUNDS: ICD-10-CM

## 2025-08-04 DIAGNOSIS — Z87.898 PERSONAL HISTORY OF OTHER SPECIFIED CONDITIONS: ICD-10-CM

## 2025-08-04 DIAGNOSIS — E78.5 HYPERLIPIDEMIA, UNSPECIFIED: ICD-10-CM

## 2025-08-04 PROCEDURE — 99213 OFFICE O/P EST LOW 20 MIN: CPT

## 2025-08-04 PROCEDURE — G2211 COMPLEX E/M VISIT ADD ON: CPT

## 2025-08-14 ENCOUNTER — APPOINTMENT (OUTPATIENT)
Dept: PSYCHIATRY | Facility: CLINIC | Age: 20
End: 2025-08-14

## 2025-09-05 ENCOUNTER — APPOINTMENT (OUTPATIENT)
Age: 20
End: 2025-09-05

## (undated) DEVICE — S&N ARTHROCARE ENT WAND TURBINATOR

## (undated) DEVICE — LABELS BLANK W PEN

## (undated) DEVICE — DRSG NASOPORE 8CM FIRM

## (undated) DEVICE — DRSG MASTISOL

## (undated) DEVICE — Device

## (undated) DEVICE — ELCTR ENT BOVIE SUCTION 10FR 6"

## (undated) DEVICE — BLADE SCALPEL SAFETYLOCK #15

## (undated) DEVICE — SOL ANTI FOG

## (undated) DEVICE — SUT PDS II 5-0 18" P-3 UNDYED

## (undated) DEVICE — STAPLER SKIN VISI-STAT 35 WIDE

## (undated) DEVICE — MARKING PEN W RULER

## (undated) DEVICE — SUT PLAIN GUT 4-0 18" SC-1

## (undated) DEVICE — SUT CHROMIC 4-0 18" P-3

## (undated) DEVICE — VISITEC 4X4

## (undated) DEVICE — DRSG STERISTRIPS 0.25 X 4"

## (undated) DEVICE — SOL IRR POUR NS 0.9% 500ML

## (undated) DEVICE — GLV 5.5 PROTEXIS (WHITE)

## (undated) DEVICE — SYR ASEPTO

## (undated) DEVICE — ELCTR BIPOLAR CORD J&J 12FT DISP

## (undated) DEVICE — WARMING BLANKET LOWER ADULT

## (undated) DEVICE — VENODYNE/SCD SLEEVE CALF MEDIUM

## (undated) DEVICE — ELCTR STRYKER NEPTUNE SMOKE EVACUATION PENCIL (GREEN)

## (undated) DEVICE — DRAPE 3/4 SHEET 52X76"

## (undated) DEVICE — DRSG TEGADERM 2.5X3"

## (undated) DEVICE — PACK SMR

## (undated) DEVICE — SUT ETHILON 3-0 18" FS-1

## (undated) DEVICE — PREP BETADINE KIT

## (undated) DEVICE — SUT MONOSOF 6-0 18" P-13

## (undated) DEVICE — SUT CHROMIC 5-0 18" P-3

## (undated) DEVICE — ZIMMER BLADE DERMATONE